# Patient Record
Sex: FEMALE | Race: BLACK OR AFRICAN AMERICAN | NOT HISPANIC OR LATINO | Employment: OTHER | URBAN - METROPOLITAN AREA
[De-identification: names, ages, dates, MRNs, and addresses within clinical notes are randomized per-mention and may not be internally consistent; named-entity substitution may affect disease eponyms.]

---

## 2017-03-20 ENCOUNTER — LAB VISIT (OUTPATIENT)
Dept: LAB | Facility: HOSPITAL | Age: 57
End: 2017-03-20
Attending: INTERNAL MEDICINE
Payer: MEDICARE

## 2017-03-20 DIAGNOSIS — I10 ESSENTIAL HYPERTENSION, MALIGNANT: Primary | ICD-10-CM

## 2017-03-20 LAB
ALBUMIN SERPL BCP-MCNC: 4.7 G/DL
ALP SERPL-CCNC: 58 IU/L
ALT SERPL W/O P-5'-P-CCNC: 28 IU/L
ANION GAP SERPL CALC-SCNC: 13 MMOL/L
AST SERPL-CCNC: 26 IU/L
BASOPHILS # BLD AUTO: 0.04 K/UL
BASOPHILS NFR BLD: 0.4 %
BILIRUB SERPL-MCNC: 0.4 MG/DL
BUN SERPL-MCNC: 9 MG/DL
CALCIUM SERPL-MCNC: 8.6 MG/DL
CHLORIDE SERPL-SCNC: 104 MMOL/L
CO2 SERPL-SCNC: 26 MMOL/L
CREAT SERPL-MCNC: 0.67 MG/DL
DIFFERENTIAL METHOD: NORMAL
EOSINOPHIL # BLD AUTO: 0.3 K/UL
EOSINOPHIL NFR BLD: 3.2 %
ERYTHROCYTE [DISTWIDTH] IN BLOOD BY AUTOMATED COUNT: 12.7 %
EST. GFR  (AFRICAN AMERICAN): >60 ML/MIN/1.73 M^2
EST. GFR  (NON AFRICAN AMERICAN): >60 ML/MIN/1.73 M^2
GLUCOSE SERPL-MCNC: 84 MG/DL
HCT VFR BLD AUTO: 41.5 %
HGB BLD-MCNC: 13.6 G/DL
LYMPHOCYTES # BLD AUTO: 1.9 K/UL
LYMPHOCYTES NFR BLD: 20.9 %
MCH RBC QN AUTO: 30.2 PG
MCHC RBC AUTO-ENTMCNC: 32.8 %
MCV RBC AUTO: 92 FL
MONOCYTES # BLD AUTO: 0.6 K/UL
MONOCYTES NFR BLD: 6.5 %
NEUTROPHILS # BLD AUTO: 6.2 K/UL
NEUTROPHILS NFR BLD: 68.9 %
PLATELET # BLD AUTO: 291 K/UL
PMV BLD AUTO: 11.4 FL
POTASSIUM SERPL-SCNC: 4.1 MMOL/L
PROT SERPL-MCNC: 7.8 G/DL
RBC # BLD AUTO: 4.5 M/UL
SODIUM SERPL-SCNC: 143 MMOL/L
WBC # BLD AUTO: 8.96 K/UL

## 2017-03-20 PROCEDURE — 84443 ASSAY THYROID STIM HORMONE: CPT

## 2017-03-20 PROCEDURE — 85025 COMPLETE CBC W/AUTO DIFF WBC: CPT | Mod: PO

## 2017-03-20 PROCEDURE — 36415 COLL VENOUS BLD VENIPUNCTURE: CPT | Mod: PO

## 2017-03-20 PROCEDURE — 80053 COMPREHEN METABOLIC PANEL: CPT | Mod: PO

## 2017-03-20 PROCEDURE — 80061 LIPID PANEL: CPT

## 2017-03-21 LAB
CHOLEST/HDLC SERPL: 3.5 {RATIO}
HDL/CHOLESTEROL RATIO: 28.7 %
HDLC SERPL-MCNC: 268 MG/DL
HDLC SERPL-MCNC: 77 MG/DL
LDLC SERPL CALC-MCNC: 168.6 MG/DL
NONHDLC SERPL-MCNC: 191 MG/DL
TRIGL SERPL-MCNC: 112 MG/DL
TSH SERPL DL<=0.005 MIU/L-ACNC: 0.56 UIU/ML

## 2017-03-27 ENCOUNTER — HOSPITAL ENCOUNTER (OUTPATIENT)
Dept: RADIOLOGY | Facility: HOSPITAL | Age: 57
Discharge: HOME | End: 2017-03-27
Attending: INTERNAL MEDICINE
Payer: MEDICARE

## 2017-03-27 DIAGNOSIS — Z12.31 SCREENING MAMMOGRAM, ENCOUNTER FOR: ICD-10-CM

## 2017-03-27 PROCEDURE — 77067 SCR MAMMO BI INCL CAD: CPT | Mod: TC

## 2018-03-06 ENCOUNTER — LAB VISIT (OUTPATIENT)
Dept: LAB | Facility: HOSPITAL | Age: 58
End: 2018-03-06
Attending: INTERNAL MEDICINE
Payer: MEDICARE

## 2018-03-06 DIAGNOSIS — E78.2 MIXED HYPERLIPIDEMIA: Primary | ICD-10-CM

## 2018-03-06 LAB
ALBUMIN SERPL BCP-MCNC: 4.4 G/DL
ALP SERPL-CCNC: 63 U/L
ALT SERPL W/O P-5'-P-CCNC: 33 U/L
ANION GAP SERPL CALC-SCNC: 10 MMOL/L
AST SERPL-CCNC: 28 U/L
BASOPHILS # BLD AUTO: 0.03 K/UL
BASOPHILS NFR BLD: 0.4 %
BILIRUB SERPL-MCNC: 0.3 MG/DL
BUN SERPL-MCNC: 13 MG/DL
CALCIUM SERPL-MCNC: 9.4 MG/DL
CHLORIDE SERPL-SCNC: 102 MMOL/L
CO2 SERPL-SCNC: 30 MMOL/L
CREAT SERPL-MCNC: 0.72 MG/DL
DIFFERENTIAL METHOD: NORMAL
EOSINOPHIL # BLD AUTO: 0.4 K/UL
EOSINOPHIL NFR BLD: 5.5 %
ERYTHROCYTE [DISTWIDTH] IN BLOOD BY AUTOMATED COUNT: 13 %
EST. GFR  (AFRICAN AMERICAN): >60 ML/MIN/1.73 M^2
EST. GFR  (NON AFRICAN AMERICAN): >60 ML/MIN/1.73 M^2
GLUCOSE SERPL-MCNC: 89 MG/DL
HCT VFR BLD AUTO: 44.1 %
HGB BLD-MCNC: 14.1 G/DL
LYMPHOCYTES # BLD AUTO: 1.4 K/UL
LYMPHOCYTES NFR BLD: 18.3 %
MCH RBC QN AUTO: 29.9 PG
MCHC RBC AUTO-ENTMCNC: 32 G/DL
MCV RBC AUTO: 94 FL
MONOCYTES # BLD AUTO: 0.6 K/UL
MONOCYTES NFR BLD: 7.2 %
NEUTROPHILS # BLD AUTO: 5.3 K/UL
NEUTROPHILS NFR BLD: 68.3 %
PLATELET # BLD AUTO: 239 K/UL
PMV BLD AUTO: 10.9 FL
POTASSIUM SERPL-SCNC: 4.4 MMOL/L
PROT SERPL-MCNC: 7.6 G/DL
RBC # BLD AUTO: 4.71 M/UL
SODIUM SERPL-SCNC: 142 MMOL/L
TSH SERPL DL<=0.005 MIU/L-ACNC: 0.69 UIU/ML
WBC # BLD AUTO: 7.81 K/UL

## 2018-03-06 PROCEDURE — 80061 LIPID PANEL: CPT

## 2018-03-06 PROCEDURE — 80053 COMPREHEN METABOLIC PANEL: CPT | Mod: PO

## 2018-03-06 PROCEDURE — 36415 COLL VENOUS BLD VENIPUNCTURE: CPT | Mod: PO

## 2018-03-06 PROCEDURE — 85025 COMPLETE CBC W/AUTO DIFF WBC: CPT | Mod: PO

## 2018-03-06 PROCEDURE — 84443 ASSAY THYROID STIM HORMONE: CPT | Mod: PO

## 2018-03-07 LAB
CHOLEST SERPL-MCNC: 233 MG/DL
CHOLEST/HDLC SERPL: 3.2 {RATIO}
HDLC SERPL-MCNC: 72 MG/DL
HDLC SERPL: 30.9 %
LDLC SERPL CALC-MCNC: 145.6 MG/DL
NONHDLC SERPL-MCNC: 161 MG/DL
TRIGL SERPL-MCNC: 77 MG/DL

## 2018-03-29 ENCOUNTER — HOSPITAL ENCOUNTER (OUTPATIENT)
Dept: RADIOLOGY | Facility: HOSPITAL | Age: 58
Discharge: HOME | End: 2018-03-29
Attending: INTERNAL MEDICINE
Payer: MEDICARE

## 2018-03-29 DIAGNOSIS — Z12.31 SCREENING MAMMOGRAM, ENCOUNTER FOR: ICD-10-CM

## 2018-03-29 DIAGNOSIS — Z12.31 SCREENING MAMMOGRAM, ENCOUNTER FOR: Primary | ICD-10-CM

## 2018-03-29 PROCEDURE — 77067 SCR MAMMO BI INCL CAD: CPT | Mod: TC,PO

## 2018-04-19 ENCOUNTER — OFFICE VISIT (OUTPATIENT)
Dept: GASTROENTEROLOGY | Facility: CLINIC | Age: 58
End: 2018-04-19
Payer: MEDICARE

## 2018-04-19 VITALS
HEART RATE: 78 BPM | DIASTOLIC BLOOD PRESSURE: 85 MMHG | HEIGHT: 63 IN | BODY MASS INDEX: 36.39 KG/M2 | WEIGHT: 205.38 LBS | SYSTOLIC BLOOD PRESSURE: 132 MMHG

## 2018-04-19 DIAGNOSIS — Z12.11 COLON CANCER SCREENING: Primary | ICD-10-CM

## 2018-04-19 PROCEDURE — 99203 OFFICE O/P NEW LOW 30 MIN: CPT | Mod: S$PBB,,, | Performed by: INTERNAL MEDICINE

## 2018-04-19 PROCEDURE — 99213 OFFICE O/P EST LOW 20 MIN: CPT | Mod: PBBFAC,PN | Performed by: INTERNAL MEDICINE

## 2018-04-19 PROCEDURE — 99999 PR PBB SHADOW E&M-EST. PATIENT-LVL III: CPT | Mod: PBBFAC,,, | Performed by: INTERNAL MEDICINE

## 2018-04-19 RX ORDER — ATORVASTATIN CALCIUM 40 MG/1
40 TABLET, FILM COATED ORAL NIGHTLY
COMMUNITY

## 2018-04-19 NOTE — PATIENT INSTRUCTIONS
Colonoscopy     A camera attached to a flexible tube with a viewing lens is used to take video pictures.     Colonoscopy is a test to view the inside of your lower digestive tract (colon and rectum). Sometimes it can show the last part of the small intestine (ileum). During the test, small pieces of tissue may be removed for testing. This is called a biopsy. Small growths, such as polyps, may also be removed.   Why is colonoscopy done?  The test is done to help look for colon cancer. And it can help find the source of abdominal pain, bleeding, and changes in bowel habits. It may be needed once a year, depending on factors such as your:  · Age  · Health history  · Family health history  · Symptoms  · Results from any prior colonoscopy  Risks and possible complications  These include:  · Bleeding               · A puncture or tear in the colon   · Risks of anesthesia  · A cancer lesion not being seen  Getting ready   To prepare for the test:  · Talk with your healthcare provider about the risks of the test (see below). Also ask your healthcare provider about alternatives to the test.  · Tell your healthcare provider about any medicines you take. Also tell him or her about any health conditions you may have.  · Make sure your rectum and colon are empty for the test. Follow the diet and bowel prep instructions exactly. If you dont, the test may need to be rescheduled.  · Plan for a friend or family member to drive you home after the test.     Colonoscopy provides an inside view of the entire colon.     You may discuss the results with your doctor right away or at a future visit.  During the test   The test is usually done in the hospital on an outpatient basis. This means you go home the same day. The procedure takes about 30 minutes. During that time:  · You are given relaxing (sedating) medicine through an IV line. You may be drowsy, or fully asleep.  · The healthcare provider will first give you a physical exam to  check for anal and rectal problems.  · Then the anus is lubricated and the scope inserted.  · If you are awake, you may have a feeling similar to needing to have a bowel movement. You may also feel pressure as air is pumped into the colon. Its OK to pass gas during the procedure.  · Biopsy, polyp removal, or other treatments may be done during the test.  After the test   You may have gas right after the test. It can help to try to pass it to help prevent later bloating. Your healthcare provider may discuss the results with you right away. Or you may need to schedule a follow-up visit to talk about the results. After the test, you can go back to your normal eating and other activities. You may be tired from the sedation and need to rest for a few hours.  Date Last Reviewed: 11/1/2016 © 2000-2017 [employer]. [address] [place] [address], [place] [address]. All rights reserved. This information is not intended as a substitute for professional medical care. Always follow your healthcare professional's instructions.

## 2018-04-19 NOTE — LETTER
April 19, 2018      Dacia Ayon MD  [address]  [address]  [facility]  [place] [address]           [place] [facility]  [address], [facility] [address],  [place] [address]  Phone: [phone]  Fax: [phone]          Patient: La Ly   MR Number: [id number]   YOB: 1960   Date of Visit: 4/19/2018       Dear Dr. Dacia Ayon:    Thank you for referring La Ly to me for evaluation. Attached you will find relevant portions of my assessment and plan of care.    If you have questions, please do not hesitate to call me. I look forward to following La Ly along with you.    Sincerely,    Hayden Doherty Jr., MD    Enclosure  CC:  No Recipients    If you would like to receive this communication electronically, please contact [email or web address] or [phone] to request more information on [facility] access.    For providers and/or their staff who would like to refer a patient to [facility], please contact us through our one-stop-shop provider referral line, [facility], at [phone].    If you feel you have received this communication in error or would no longer like to receive these types of communications, please e-mail [email or web address]

## 2018-04-19 NOTE — PROGRESS NOTES
History and Physical      Chief complaints: Requesting screening colonscopy    History of Presenting Illness    Patient requesting colonoscopy.  Patient denies any abdominal pain, weight loss or blood in the stool.  There is no family history of colon cancer.  GI systems review is negative  Past medical history is unremarkable.  Occasional smoker of cigarettes.  Family history negative for GI neoplasm.       No past medical history on file.    Past Surgical History:   Procedure Laterality Date    HYSTERECTOMY         Family History   Problem Relation Age of Onset    No Known Problems Mother     No Known Problems Father        Social History     Social History    Marital status:      Spouse name: N/A    Number of children: N/A    Years of education: N/A     Social History Main Topics    Smoking status: Never Smoker    Smokeless tobacco: None    Alcohol use No    Drug use: No    Sexual activity: Not Asked     Other Topics Concern    None     Social History Narrative    None       Current Outpatient Prescriptions   Medication Sig Dispense Refill    atorvastatin (LIPITOR) 40 MG tablet Take 40 mg by mouth every evening.      hydrocodone-acetaminophen 5-325mg (NORCO) 5-325 mg per tablet Take 1 tablet by mouth every 4 (four) hours as needed for Pain. 20 tablet 0    naproxen (NAPROSYN) 500 MG tablet Take 1 tablet (500 mg total) by mouth 2 (two) times daily with meals. 14 tablet 0     No current facility-administered medications for this visit.        Review of patient's allergies indicates:  No Known Allergies    Objective:      Vitals:    04/19/18 1530   BP: 132/85   Pulse: 78     Physical Exam   Constitutional: Patient is oriented to person, place, and time. Appears well-nourished.   HENT: sclera non icteric  Mouth/Throat: Oropharynx is clear and moist.   Eyes: Pupils are equal, round, and reactive to light.   Neck: Neck supple.   Cardiovascular: Normal heart sounds.   Pulmonary/Chest: Effort  normal and breath sounds normal.   Abdominal: Soft. Exhibits no mass. There is no tenderness. There is no guarding.    Neurological:Alert and oriented to person, place, and time.   Skin: Skin is warm. No rash noted.   Psychiatric: Has a normal mood and affect.     Assessment:  Colon cancer screening    Plan:  Colonoscopy     CC Dr. beatriz PLATA have reviewed the patient's medical history in detail and updated the computerized patient record

## 2018-04-20 ENCOUNTER — TELEPHONE (OUTPATIENT)
Dept: GASTROENTEROLOGY | Facility: CLINIC | Age: 58
End: 2018-04-20

## 2018-04-23 ENCOUNTER — TELEPHONE (OUTPATIENT)
Dept: GASTROENTEROLOGY | Facility: CLINIC | Age: 58
End: 2018-04-23

## 2018-04-23 DIAGNOSIS — Z12.11 ENCOUNTER FOR SCREENING COLONOSCOPY: Primary | ICD-10-CM

## 2018-04-23 NOTE — TELEPHONE ENCOUNTER
----- Message from Kat Bennett sent at 4/23/2018  1:59 PM CDT -----  Contact: self/[phone]  Patient would like to be scheduled for a colonoscopy.  Please advise

## 2018-04-23 NOTE — TELEPHONE ENCOUNTER
Patient is scheduled for Screening Colonoscopy at [facility] on 5/14/18,prep instructions was explained and given at office visit.

## 2018-04-23 NOTE — TELEPHONE ENCOUNTER
A message was left on patient's voice mail that the office was returning her call in regards to scheduling a Colnoscopy.

## 2018-04-23 NOTE — TELEPHONE ENCOUNTER
----- Message from Karla Ramsey sent at 4/23/2018  2:30 PM CDT -----  Contact: Self [phone]  Patient Returning Your Phone Call

## 2018-04-23 NOTE — TELEPHONE ENCOUNTER
----- Message from Lala Poon sent at 4/23/2018 11:46 AM CDT -----  Contact: [phone]/self  Patient is returning your call. Please advise.

## 2018-05-14 ENCOUNTER — ANESTHESIA (OUTPATIENT)
Dept: ENDOSCOPY | Facility: HOSPITAL | Age: 58
End: 2018-05-14
Payer: MEDICARE

## 2018-05-14 ENCOUNTER — ANESTHESIA EVENT (OUTPATIENT)
Dept: ENDOSCOPY | Facility: HOSPITAL | Age: 58
End: 2018-05-14
Payer: MEDICARE

## 2018-05-14 ENCOUNTER — HOSPITAL ENCOUNTER (OUTPATIENT)
Facility: HOSPITAL | Age: 58
Discharge: HOME | End: 2018-05-14
Attending: INTERNAL MEDICINE | Admitting: INTERNAL MEDICINE
Payer: MEDICARE

## 2018-05-14 VITALS
BODY MASS INDEX: 31.39 KG/M2 | SYSTOLIC BLOOD PRESSURE: 153 MMHG | OXYGEN SATURATION: 100 % | DIASTOLIC BLOOD PRESSURE: 78 MMHG | TEMPERATURE: 98 F | RESPIRATION RATE: 14 BRPM | HEIGHT: 67 IN | HEART RATE: 63 BPM | WEIGHT: 200 LBS

## 2018-05-14 DIAGNOSIS — Z12.11 SCREENING FOR COLORECTAL CANCER: ICD-10-CM

## 2018-05-14 DIAGNOSIS — Z12.12 SCREENING FOR COLORECTAL CANCER: ICD-10-CM

## 2018-05-14 DIAGNOSIS — Z12.11 COLON CANCER SCREENING: Primary | ICD-10-CM

## 2018-05-14 PROCEDURE — 45378 DIAGNOSTIC COLONOSCOPY: CPT | Mod: ,,, | Performed by: INTERNAL MEDICINE

## 2018-05-14 PROCEDURE — 00812 ANES LWR INTST SCR COLSC: CPT | Performed by: INTERNAL MEDICINE

## 2018-05-14 PROCEDURE — 25000003 PHARM REV CODE 250: Performed by: INTERNAL MEDICINE

## 2018-05-14 PROCEDURE — 63600175 PHARM REV CODE 636 W HCPCS: Performed by: NURSE ANESTHETIST, CERTIFIED REGISTERED

## 2018-05-14 PROCEDURE — G0121 COLON CA SCRN NOT HI RSK IND: HCPCS | Performed by: INTERNAL MEDICINE

## 2018-05-14 PROCEDURE — 37000009 HC ANESTHESIA EA ADD 15 MINS: Performed by: INTERNAL MEDICINE

## 2018-05-14 PROCEDURE — 37000008 HC ANESTHESIA 1ST 15 MINUTES: Performed by: INTERNAL MEDICINE

## 2018-05-14 RX ORDER — PROPOFOL 10 MG/ML
VIAL (ML) INTRAVENOUS CONTINUOUS PRN
Status: DISCONTINUED | OUTPATIENT
Start: 2018-05-14 | End: 2018-05-14

## 2018-05-14 RX ORDER — PROPOFOL 10 MG/ML
VIAL (ML) INTRAVENOUS
Status: DISCONTINUED | OUTPATIENT
Start: 2018-05-14 | End: 2018-05-14

## 2018-05-14 RX ORDER — SODIUM CHLORIDE 9 MG/ML
INJECTION, SOLUTION INTRAVENOUS CONTINUOUS
Status: DISCONTINUED | OUTPATIENT
Start: 2018-05-14 | End: 2018-05-14 | Stop reason: HOSPADM

## 2018-05-14 RX ORDER — LIDOCAINE HCL/PF 100 MG/5ML
SYRINGE (ML) INTRAVENOUS
Status: DISCONTINUED | OUTPATIENT
Start: 2018-05-14 | End: 2018-05-14

## 2018-05-14 RX ADMIN — SODIUM CHLORIDE: 0.9 INJECTION, SOLUTION INTRAVENOUS at 01:05

## 2018-05-14 RX ADMIN — LIDOCAINE HYDROCHLORIDE 100 MG: 20 INJECTION, SOLUTION INTRAVENOUS at 01:05

## 2018-05-14 RX ADMIN — PROPOFOL 80 MG: 10 INJECTION, EMULSION INTRAVENOUS at 01:05

## 2018-05-14 RX ADMIN — PROPOFOL 150 MCG/KG/MIN: 10 INJECTION, EMULSION INTRAVENOUS at 01:05

## 2018-05-14 NOTE — TRANSFER OF CARE
"Anesthesia Transfer of Care Note    Patient: La Ly    Procedure(s) Performed: Procedure(s) (LRB):  COLONOSCOPY (N/A)    Patient location: GI    Anesthesia Type: MAC    Transport from OR: Transported from OR on room air with adequate spontaneous ventilation    Post pain: adequate analgesia    Post assessment: no apparent anesthetic complications and tolerated procedure well    Post vital signs: stable    Level of consciousness: awake, alert and oriented    Nausea/Vomiting: no nausea/vomiting    Complications: none    Transfer of care protocol was followed      Last vitals:   Visit Vitals  BP (!) 150/65   Pulse 67   Temp 36.7 °C (98 °F)   Resp 20   Ht 5' 7" (1.702 m)   Wt 90.7 kg (200 lb)   SpO2 97%   Breastfeeding? No   BMI 31.32 kg/m²     "

## 2018-05-14 NOTE — ANESTHESIA POSTPROCEDURE EVALUATION
"Anesthesia Post Evaluation    Patient: La Ly    Procedure(s) Performed: Procedure(s) (LRB):  COLONOSCOPY (N/A)    Final Anesthesia Type: MAC  Patient location during evaluation: GI PACU  Patient participation: Yes- Able to Participate  Level of consciousness: awake and alert and oriented  Post-procedure vital signs: reviewed and stable  Pain management: adequate  Airway patency: patent  PONV status at discharge: No PONV  Anesthetic complications: no      Cardiovascular status: blood pressure returned to baseline, hemodynamically stable and stable  Respiratory status: unassisted, spontaneous ventilation and room air  Hydration status: euvolemic  Follow-up not needed.        Visit Vitals  BP (!) 150/65   Pulse 67   Temp 36.7 °C (98 °F)   Resp 20   Ht 5' 7" (1.702 m)   Wt 90.7 kg (200 lb)   SpO2 97%   Breastfeeding? No   BMI 31.32 kg/m²       Pain/Luiz Score: Pain Assessment Performed: Yes (5/14/2018  1:34 PM)  Presence of Pain: denies (5/14/2018  1:24 PM)      "

## 2018-05-14 NOTE — ANESTHESIA PREPROCEDURE EVALUATION
05/14/2018  La Ly is a 58 y.o., female for colonoscopy under MAC          Anesthesia Evaluation    I have reviewed the Patient Summary Reports.     I have reviewed the Medications.     Review of Systems  Cardiovascular:   hyperlipidemia    Hepatic/GI:   Bowel Prep.        Physical Exam  General:  Well nourished       Chest/Lungs:  Chest/Lungs Clear    Heart/Vascular:  Heart Findings: Normal            Anesthesia Plan  Type of Anesthesia, risks & benefits discussed:  Anesthesia Type:  MAC  Patient's Preference:   Intra-op Monitoring Plan:   Intra-op Monitoring Plan Comments:   Post Op Pain Control Plan:   Post Op Pain Control Plan Comments:   Induction:    Beta Blocker:  Patient is not currently on a Beta-Blocker (No further documentation required).       Informed Consent: Patient understands risks and agrees with Anesthesia plan.  Questions answered. Anesthesia consent signed with patient.  ASA Score: 2     Day of Surgery Review of History & Physical:            Ready For Surgery From Anesthesia Perspective.

## 2018-05-14 NOTE — PROVATION PATIENT INSTRUCTIONS
Discharge Summary/Instructions after an Endoscopic Procedure  Patient Name: La Ly  Patient MRN: [id number]  Patient YOB: 1960  Monday, May 14, 2018  Hayden Doherty MD  RESTRICTIONS:  During your procedure today, you received medications for sedation.  These   medications may affect your judgment, balance and coordination.  Therefore,   for 24 hours, you have the following restrictions:   - DO NOT drive a car, operate machinery, make legal/financial decisions,   sign important papers or drink alcohol.    ACTIVITY:  The following day: return to full activity including work, except no heavy   lifting, straining or running for 3 days if polyps were removed.  DIET:  Eat and drink normally unless instructed otherwise.     TREATMENT FOR COMMON SIDE EFFECTS:  - Mild abdominal pain, nausea, belching, bloating or excessive gas:  rest,   eat lightly and use a heating pad.  - Sore Throat: treat with throat lozenges and/or gargle with warm salt   water.  - Because air was used during the procedure, expelling large amounts of air   from your rectum or belching is normal.  - If a bowel prep was taken, you may not have a bowel movement for 1-3 days.    This is normal.  SYMPTOMS TO WATCH FOR AND REPORT TO YOUR PHYSICIAN:  1. Abdominal pain or bloating, other than gas cramps.  2. Chest pain.  3. Back pain.  4. Signs of infection such as: chills or fever occurring within 24 hours   after the procedure.  5. Rectal bleeding, which would show as bright red, maroon, or black stools.   (A tablespoon of blood from the rectum is not serious, especially if   hemorrhoids are present.)  6. Vomiting.  7. Weakness or dizziness.  GO DIRECTLY TO THE NEAREST EMERGENCY ROOM IF YOU HAVE ANY OF THE FOLLOWING:      Difficulty breathing              Chills and/or fever over 101 F   Persistent vomiting and/or vomiting blood   Severe abdominal pain   Severe chest pain   Black, tarry stools   Bleeding- more than one tablespoon   Any  other symptom or condition that you feel may need urgent attention  Your doctor recommends these additional instructions:  If any biopsies were taken, your doctors clinic will contact you in 1 to 2   weeks with any results.  - Discharge patient to home.   - Patient has a contact number available for emergencies.  The signs and   symptoms of potential delayed complications were discussed with the   patient.  Return to normal activities tomorrow.  Written discharge   instructions were provided to the patient.   - Resume previous diet.   - Continue present medications.   - Repeat colonoscopy in 10 years for screening purposes.   - Return to primary care physician.  For questions, problems or results please call your physician - Hayden Doherty MD at Work:  [phone].  EMERGENCY PHONE NUMBER: [phone],  LAB RESULTS: [phone]  IF A COMPLICATION OR EMERGENCY SITUATION ARISES AND YOU ARE UNABLE TO REACH   YOUR PHYSICIAN - GO DIRECTLY TO THE EMERGENCY ROOM.  Hayden Doherty MD  5/14/2018 2:10:56 PM  This report has been verified and signed electronically.

## 2018-05-23 NOTE — H&P
Patient requesting colonoscopy.  Patient denies any abdominal pain, weight loss or blood in the stool.  There is no family history of colon cancer.  GI systems review is negative  Past medical history is unremarkable.  Occasional smoker of cigarettes.  Family history negative for GI neoplasm.        No past medical history on file.           Past Surgical History:   Procedure Laterality Date    HYSTERECTOMY                   Family History   Problem Relation Age of Onset    No Known Problems Mother      No Known Problems Father           Social History            Social History    Marital status:        Spouse name: N/A    Number of children: N/A    Years of education: N/A           Social History Main Topics    Smoking status: Never Smoker    Smokeless tobacco: None    Alcohol use No    Drug use: No    Sexual activity: Not Asked           Other Topics Concern    None          Social History Narrative    None         Current Medications          Current Outpatient Prescriptions   Medication Sig Dispense Refill    atorvastatin (LIPITOR) 40 MG tablet Take 40 mg by mouth every evening.        hydrocodone-acetaminophen 5-325mg (NORCO) 5-325 mg per tablet Take 1 tablet by mouth every 4 (four) hours as needed for Pain. 20 tablet 0    naproxen (NAPROSYN) 500 MG tablet Take 1 tablet (500 mg total) by mouth 2 (two) times daily with meals. 14 tablet 0      No current facility-administered medications for this visit.             Review of patient's allergies indicates:  No Known Allergies     Objective:           Vitals:                  Physical Exam   Constitutional: Patient is oriented to person, place, and time. Appears well-nourished.   HENT: sclera non icteric  Mouth/Throat: Oropharynx is clear and moist.   Eyes: Pupils are equal, round, and reactive to light.   Neck: Neck supple.   Cardiovascular: Normal heart sounds.   Pulmonary/Chest: Effort normal and breath sounds normal.   Abdominal: Soft.  Exhibits no mass. There is no tenderness. There is no guarding.    Neurological:Alert and oriented to person, place, and time.   Skin: Skin is warm. No rash noted.   Psychiatric: Has a normal mood and affect.      Assessment:  Colon cancer screening     Plan:  Colonoscopy      CC Dr. beatriz PLATA have reviewed the patient's medical history in detail and updated the computerized patient record

## 2020-01-08 ENCOUNTER — HOSPITAL ENCOUNTER (OUTPATIENT)
Dept: RADIOLOGY | Facility: HOSPITAL | Age: 60
Discharge: HOME | End: 2020-01-08
Attending: INTERNAL MEDICINE
Payer: MEDICARE

## 2020-01-08 DIAGNOSIS — Z12.31 ENCOUNTER FOR SCREENING MAMMOGRAM FOR MALIGNANT NEOPLASM OF BREAST: ICD-10-CM

## 2020-01-08 DIAGNOSIS — Z13.820 ENCOUNTER FOR IMAGING TO ASSESS OSTEOPOROSIS: ICD-10-CM

## 2020-01-08 PROCEDURE — 77080 DXA BONE DENSITY AXIAL: CPT | Mod: GA,TC,PO

## 2020-01-08 PROCEDURE — 77067 SCR MAMMO BI INCL CAD: CPT | Mod: TC,PO

## 2020-11-30 NOTE — OR NURSING
Family updated as to patient's status.  
Introduction Text (Please End With A Colon): The following procedure was deferred
Detail Level: Detailed
Procedure To Be Performed At Next Visit: Prescription

## 2021-05-28 ENCOUNTER — IMMUNIZATION (OUTPATIENT)
Dept: PRIMARY CARE CLINIC | Facility: CLINIC | Age: 61
End: 2021-05-28

## 2021-05-28 DIAGNOSIS — Z23 NEED FOR VACCINATION: Primary | ICD-10-CM

## 2021-05-28 PROCEDURE — 0001A COVID-19, MRNA, LNP-S, PF, 30 MCG/0.3 ML DOSE VACCINE: ICD-10-PCS | Mod: CV19,S$GLB,, | Performed by: INTERNAL MEDICINE

## 2021-05-28 PROCEDURE — 91300 COVID-19, MRNA, LNP-S, PF, 30 MCG/0.3 ML DOSE VACCINE: ICD-10-PCS | Mod: S$GLB,,, | Performed by: INTERNAL MEDICINE

## 2021-05-28 PROCEDURE — 91300 COVID-19, MRNA, LNP-S, PF, 30 MCG/0.3 ML DOSE VACCINE: CPT | Mod: S$GLB,,, | Performed by: INTERNAL MEDICINE

## 2021-05-28 PROCEDURE — 0001A COVID-19, MRNA, LNP-S, PF, 30 MCG/0.3 ML DOSE VACCINE: CPT | Mod: CV19,S$GLB,, | Performed by: INTERNAL MEDICINE

## 2021-06-22 ENCOUNTER — IMMUNIZATION (OUTPATIENT)
Dept: FAMILY MEDICINE | Facility: CLINIC | Age: 61
End: 2021-06-22
Payer: MEDICAID

## 2021-06-22 DIAGNOSIS — Z23 NEED FOR VACCINATION: Primary | ICD-10-CM

## 2021-06-22 PROCEDURE — 91300 COVID-19, MRNA, LNP-S, PF, 30 MCG/0.3 ML DOSE VACCINE: CPT | Mod: PBBFAC,PN

## 2021-06-22 PROCEDURE — 0002A COVID-19, MRNA, LNP-S, PF, 30 MCG/0.3 ML DOSE VACCINE: CPT | Mod: PBBFAC,PN

## 2022-01-25 ENCOUNTER — LAB VISIT (OUTPATIENT)
Dept: PRIMARY CARE CLINIC | Facility: OTHER | Age: 62
End: 2022-01-25
Attending: INTERNAL MEDICINE
Payer: MEDICARE

## 2022-01-25 DIAGNOSIS — U07.1 COVID-19: Primary | ICD-10-CM

## 2022-01-25 LAB
CTP QC/QA: YES
SARS-COV-2 AG RESP QL IA.RAPID: NEGATIVE

## 2022-01-25 PROCEDURE — 87811 SARS-COV-2 COVID19 W/OPTIC: CPT

## 2022-01-25 NOTE — PROGRESS NOTES
Instructions for Patients with Confirmed or Suspected COVID-19    If you are awaiting your test result, you will either be called or it will be released to the patient portal.  If you have any questions about your test, please visit [email or web address] or call our COVID-19 information line at [phone].      Please isolate yourself at home.  You may leave home and/or return to work once the following conditions are met:    If you have symptoms and tested positive:   More than 5 days since symptoms first appeared AND   More than 24 hours fever free without medications AND       symptoms have improved   · For five days after ending isolation, masks are required.    If you had no symptoms but tested positive:   More than 5 days since the date of the first positive test. If you develop symptoms, then use the guidelines above  · For five days after ending isolation, masks are required.      Testing is not recommended if you are symptom free after completing isolation.

## 2022-03-17 ENCOUNTER — HOSPITAL ENCOUNTER (OUTPATIENT)
Dept: RADIOLOGY | Facility: HOSPITAL | Age: 62
Discharge: HOME | End: 2022-03-17
Attending: INTERNAL MEDICINE
Payer: MEDICARE

## 2022-03-17 DIAGNOSIS — R10.84 ABDOMINAL PAIN, GENERALIZED: ICD-10-CM

## 2022-03-17 PROCEDURE — 76830 TRANSVAGINAL US NON-OB: CPT | Mod: TC,PO

## 2022-03-17 PROCEDURE — 76700 US EXAM ABDOM COMPLETE: CPT | Mod: TC,PO

## 2022-06-15 DIAGNOSIS — M25.462 EFFUSION OF LEFT KNEE: Primary | ICD-10-CM

## 2022-06-15 DIAGNOSIS — M25.562 PAIN IN LEFT KNEE: ICD-10-CM

## 2022-07-13 ENCOUNTER — HOSPITAL ENCOUNTER (EMERGENCY)
Facility: HOSPITAL | Age: 62
Discharge: HOME | End: 2022-07-14
Payer: MEDICARE

## 2022-07-13 DIAGNOSIS — M25.562 LEFT KNEE PAIN: ICD-10-CM

## 2022-07-13 PROCEDURE — 99283 EMERGENCY DEPT VISIT LOW MDM: CPT

## 2022-07-13 NOTE — Clinical Note
"La CURIEL "Mahin Ly was seen and treated in our emergency department on 7/13/2022.  She may return to work on 07/15/2022.  Please excuse from work on 7/14/22.     If you have any questions or concerns, please don't hesitate to call.      Matias Giron MD"

## 2022-07-14 VITALS
BODY MASS INDEX: 35.24 KG/M2 | TEMPERATURE: 98 F | HEART RATE: 77 BPM | DIASTOLIC BLOOD PRESSURE: 71 MMHG | RESPIRATION RATE: 16 BRPM | OXYGEN SATURATION: 100 % | WEIGHT: 225 LBS | SYSTOLIC BLOOD PRESSURE: 135 MMHG

## 2022-07-14 RX ORDER — HYDROCODONE BITARTRATE AND ACETAMINOPHEN 5; 325 MG/1; MG/1
1 TABLET ORAL EVERY 6 HOURS PRN
Qty: 12 TABLET | Refills: 0 | Status: SHIPPED | OUTPATIENT
Start: 2022-07-14

## 2022-07-14 NOTE — ED NOTES
Pt c/o L knee pain and wants an XRAY. Pt denies recent trauma and other complaints.     Review of patient's allergies indicates:  No Known Allergies     Patient has verified the spelling of their name and  on armband.   APPEARANCE: Patient is alert, calm, oriented x 4, and does not appear distressed.  SKIN: Skin is normal for race, warm, and dry. Normal skin turgor and mucous membranes moist.  CARDIAC: Normal rate and rhythm, no murmur heard.   RESPIRATORY:Normal rate and effort. Breath sounds clear bilaterally throughout chest. Respirations are equal and unlabored.    GASTRO: Bowel sounds normal, abdomen is soft, no tenderness, and no abdominal distention.  MUSCLE: Full ROM. No bony tenderness or soft tissue tenderness. No obvious deformity.  PERIPHERAL VASCULAR: peripheral pulses present. Normal cap refill. No edema. Warm to touch.  NEURO: 5/5 strength major flexors/extensors bilaterally. Sensory intact to light touch bilaterally. [place] coma scale: eyes open spontaneously-4, oriented & converses-5, obeys commands-6. No neurological abnormalities.   MENTAL STATUS: awake, alert and aware of environment.  : Voids without complication      ED orders in progress. Pt SR up x 2. Bed in lowest position with wheels locked. Call bell within reach of pt.

## 2022-07-14 NOTE — FIRST PROVIDER EVALUATION
Emergency Department TeleTriage Encounter Note      CHIEF COMPLAINT    Chief Complaint   Patient presents with    Knee Pain     Left lateral knee pain that started 3 months ago after missing a step and falling on knee. Was seen by PCP 1 month ago and prescribed Mobic. States has not been helping.  Requesting an xray since it was not done. Patient is ambulatory.        VITAL SIGNS   Initial Vitals [07/13/22 2126]   BP Pulse Resp Temp SpO2   (!) 144/71 79 18 98.2 °F (36.8 °C) 98 %      MAP       --            ALLERGIES    Review of patient's allergies indicates:  No Known Allergies    PROVIDER TRIAGE NOTE  This is a teletriage evaluation of a 62 y.o. female presenting to the ED with c/o left lateral knee pain.  Pt states 3 months ago she missed a step and fell.  Pt was seen by PCP and set up PT but patient has not started yet.  Pt had imaging done 6/16 (but does not remember) and denies any falls since that time.      PE: VSS.  NAD noted.  Steady gait appreciated.      Plan: monitor    Limited physical exam via telehealth: The patient is awake, alert, answering questions appropriately and is not in respiratory distress. All ED beds are full at present; patient notified of this status.  Patient seen and medically screened by Nurse Practitioner via teletriage.      Initial orders will be placed and care will be transferred to an alternate provider when patient is roomed for a full evaluation. Any additional orders and the final disposition will be determined by that provider.         ORDERS  Labs Reviewed - No data to display    ED Orders (720h ago, onward)    None            Virtual Visit Note: The provider triage portion of this emergency department evaluation and documentation was performed via [employer], a HIPAA-compliant telemedicine application, in concert with a tele-presenter in the room. A face to face patient evaluation with one of my colleagues will occur once the patient is placed in an emergency  department room.      DISCLAIMER: This note was prepared with [employer] voice recognition transcription software. Garbled syntax, mangled pronouns, and other bizarre constructions may be attributed to that software system.

## 2022-07-14 NOTE — ED PROVIDER NOTES
Encounter Date: 7/13/2022       History     Chief Complaint   Patient presents with    Knee Pain     Left lateral knee pain that started 3 months ago after missing a step and falling on knee. Was seen by PCP 1 month ago and prescribed Mobic. States has not been helping.  Requesting an xray since it was not done. Patient is ambulatory.      HPI   Patient presenting to ED for evaluation of left knee pain ongoing over the past 3 months.  Patient says patient initially started after having accidental fall, landing on anterior left knee.  Since then she has continued to have pain along the anterior and medial aspects of her left knee.  Pain seems worse with ambulation but patient reports being able to walk.  Has tried anti-inflammatory medication and ace wrap to area without much relief.  She had seen her PCP about a month ago for this complaints and was prescribed Mobic which has not helped, says she has not had any imaging of knee so far.  Denies any new injury or trauma.  No other specific complaints at this time.      Review of patient's allergies indicates:  No Known Allergies  Past Medical History:   Diagnosis Date    Eczema     Hyperlipidemia      Past Surgical History:   Procedure Laterality Date    COLONOSCOPY N/A 5/14/2018    Procedure: COLONOSCOPY;  Surgeon: Hayden Doherty Jr., MD;  Location: [facility];  Service: Endoscopy;  Laterality: N/A;    TUBAL LIGATION       Family History   Problem Relation Age of Onset    COPD Mother     No Known Problems Father      Social History     Tobacco Use    Smoking status: Current Every Day Smoker     Packs/day: 0.50    Smokeless tobacco: Never Used    Tobacco comment: 2 cig a day   Substance Use Topics    Alcohol use: No    Drug use: No     Review of Systems   Constitutional: Negative for chills and fever.   Respiratory: Negative for shortness of breath.    Cardiovascular: Negative for chest pain.   Gastrointestinal: Negative for abdominal pain.    Musculoskeletal: Positive for arthralgias.   Skin: Negative for color change, pallor, rash and wound.   Allergic/Immunologic: Negative for immunocompromised state.   Neurological: Negative for numbness.   Hematological: Does not bruise/bleed easily.       Physical Exam     Initial Vitals [07/13/22 2126]   BP Pulse Resp Temp SpO2   (!) 144/71 79 18 98.2 °F (36.8 °C) 98 %      MAP       --         Physical Exam    Constitutional: She appears well-developed. No distress.   HENT:   Head: Normocephalic.   Neck:   Normal range of motion.  Cardiovascular: Normal rate, regular rhythm and intact distal pulses.   Pulmonary/Chest: No respiratory distress.   Musculoskeletal:      Cervical back: Normal range of motion.      Left knee: Swelling (mild) present. No deformity or crepitus. Tenderness present over the medial joint line. No LCL laxity, MCL laxity, ACL laxity or PCL laxity.Normal alignment and normal patellar mobility. Normal pulse.      Instability Tests: Anterior drawer test negative. Posterior drawer test negative.        Legs:       Comments: No tenderness to left hip, ankle, or foot.  Distal neurovascular function intact.     Neurological: She is alert and oriented to person, place, and time. She has normal strength. No cranial nerve deficit or sensory deficit.   Skin: Skin is warm and dry. Capillary refill takes less than 2 seconds. No rash noted.   Psychiatric: She has a normal mood and affect.         ED Course   Procedures  Labs Reviewed - No data to display       Imaging Results          X-Ray Knee 3 View Left (Final result)  Result time 07/14/22 00:09:45    Final result by Feliciano Amador MD (07/14/22 00:09:45)                 Impression:      Negative left knee, stable since exam      Electronically signed by: Feliciano Amador  Date:    07/14/2022  Time:    00:09             Narrative:    EXAMINATION:  XR KNEE 3 VIEW LEFT    CLINICAL HISTORY:  Pain in left knee    TECHNIQUE:  AP, lateral, and Merchant  views of the left knee were performed.    COMPARISON:  06/16/2022 left knee    FINDINGS:  The bones, joint spaces and soft tissues appear intact.  There is no evidence of fracture or effusion.                                 Medications - No data to display    MDM:  Xrays of left knee negative for acute abnormalities.  Patient initially reported no prior imaging had been done, however in review of old records it appears xray with no acute findings was performed on 6/16 ordered by her PCP.  Given persistence of left knee pain over past several months, patient may benefit from orthopedic surgery evaluation, outpatient referral to be initiated in ED.  She was also encouraged to follow up with her PCP regarding possible physical therapy.  Does not appear to be indication for further emergent testing, observation, or hospitalization at this time.  Patient appears stable for and is comfortable with discharge home.  Advised to follow-up with PCP for outpatient recheck.  Signs and symptoms that would warrant immediate return to ED were reviewed prior to discharge.      Clinical Impression:   Final diagnoses:  [M25.562] Left knee pain        ED Disposition Condition    Discharge Stable        ED Prescriptions     Medication Sig Dispense Start Date End Date Auth. Provider    HYDROcodone-acetaminophen (NORCO) 5-325 mg per tablet Take 1 tablet by mouth every 6 (six) hours as needed for Pain. 12 tablet 7/14/2022  Matias Giron MD        Follow-up Information     Follow up With Specialties Details Why Contact Info    Dacia Ayon MD Internal Medicine Schedule an appointment as soon as possible for a visit  Follow up with your primary care physician for outpatient recheck.  Discuss with them possible physical therapy and/or MRI for further evaluation/management. [address] [place] [id number]  [phone]      [facility] Emergency Dept Emergency Medicine  Return to the ED sooner for any new or worsening symptoms or for  any other concerns. [address] [facility] [id number] [phone]           Matias Giron MD  07/14/22 0124

## 2022-07-18 ENCOUNTER — TELEPHONE (OUTPATIENT)
Dept: ORTHOPEDICS | Facility: CLINIC | Age: 62
End: 2022-07-18
Payer: MEDICARE

## 2022-07-18 NOTE — TELEPHONE ENCOUNTER
Spoke to Pt @ 9:00am to schedule appointment for July 20 th @ 8:00am with Beatriz Carrillo for left knee pain

## 2022-07-20 ENCOUNTER — OFFICE VISIT (OUTPATIENT)
Dept: ORTHOPEDICS | Facility: CLINIC | Age: 62
End: 2022-07-20
Payer: MEDICARE

## 2022-07-20 ENCOUNTER — TELEPHONE (OUTPATIENT)
Dept: ORTHOPEDICS | Facility: CLINIC | Age: 62
End: 2022-07-20

## 2022-07-20 ENCOUNTER — HOSPITAL ENCOUNTER (OUTPATIENT)
Dept: RADIOLOGY | Facility: HOSPITAL | Age: 62
Discharge: HOME | End: 2022-07-20
Attending: NURSE PRACTITIONER
Payer: MEDICARE

## 2022-07-20 VITALS — WEIGHT: 227.13 LBS | BODY MASS INDEX: 35.65 KG/M2 | HEIGHT: 67 IN

## 2022-07-20 DIAGNOSIS — M25.562 LEFT KNEE PAIN: ICD-10-CM

## 2022-07-20 PROCEDURE — 99999 PR PBB SHADOW E&M-EST. PATIENT-LVL III: CPT | Mod: PBBFAC,,, | Performed by: NURSE PRACTITIONER

## 2022-07-20 PROCEDURE — 73721 MRI JNT OF LWR EXTRE W/O DYE: CPT | Mod: TC,PO,LT

## 2022-07-20 PROCEDURE — 99204 PR OFFICE/OUTPT VISIT, NEW, LEVL IV, 45-59 MIN: ICD-10-PCS | Mod: S$PBB,,, | Performed by: NURSE PRACTITIONER

## 2022-07-20 PROCEDURE — 99204 OFFICE O/P NEW MOD 45 MIN: CPT | Mod: S$PBB,,, | Performed by: NURSE PRACTITIONER

## 2022-07-20 PROCEDURE — 99999 PR PBB SHADOW E&M-EST. PATIENT-LVL III: ICD-10-PCS | Mod: PBBFAC,,, | Performed by: NURSE PRACTITIONER

## 2022-07-20 RX ORDER — MELOXICAM 15 MG/1
15 TABLET ORAL DAILY
Qty: 30 TABLET | Refills: 1 | Status: SHIPPED | OUTPATIENT
Start: 2022-07-20 | End: 2022-09-14

## 2022-07-20 NOTE — TELEPHONE ENCOUNTER
Called patient with MRI results and left a message letting her know that I set up an appt for her with Dr. Morocho at [facility]. Left the address and phone number of the sports medicine clinic as well.

## 2022-07-20 NOTE — PROGRESS NOTES
CC: Pain of the Left Knee      HPI: Pt with c/o left knee pain for the past 2 months. The pain is aching and global and worse if she puts her heel down. The pain started after she missed a step while walking down some steps. She has been stretching and walking to help with the pain, but she still can't put her heel flat due to pain. She was seen in the ER on 7/13 and had an xray that showed no fracture or significant djd. She was given hydrocodone and naproxen, but that did not relieve her pain. She was told to f/u in ortho clinic. She is ambulating without assistive device. There is a limp due to her not being able to put her heel down..    ROS  General: denies fever and chills  Resp: no c/o sob  CVS: no c/o cp  MSK: c/o left knee pain    PE  General: AAOx3, pleasant and cooperative  Resp: respirations even and unlabored  MSK: left knee exam  0 degrees extension  120 degrees flexion  No warmth or erythema   - effusion  Minimal crepitus  + tenderness over the medial joint line  + mat, medial  5/5 quad strength  - instability    Xray:  Reviewed xray from ER: no fracture or significant djd noted    Assessment:  Left knee injury    Plan:  MRI of the left knee for further evaluation  mobic daily  RICE  F/u results by phone     [FreeTextEntry1] : Viral GE.  stable for now. \par 1. Push appropriate fluids - Pedialyte best, not plain water. Give often. \par 2. Give one dose of ondansetron 3.5 ml, wait 1/2 hr then start fluids, bananas, rice, and chicken soup with added rice. NO medicines to stop diarrhea in child. One dose likely all that will be needed. Do not give more doses unless still vomiting. Father expresses understanding. \par 3. If looking worse or dehydrated, to [facility] or to ER for IV.\par RTO tomorrow if not better. \par (Advised to stop cereal in nightly meals and review feeding with us when she is better, for weight control).

## 2022-07-22 ENCOUNTER — TELEPHONE (OUTPATIENT)
Dept: SPORTS MEDICINE | Facility: CLINIC | Age: 62
End: 2022-07-22
Payer: MEDICARE

## 2022-07-22 DIAGNOSIS — M25.562 LEFT KNEE PAIN, UNSPECIFIED CHRONICITY: Primary | ICD-10-CM

## 2022-07-25 ENCOUNTER — OFFICE VISIT (OUTPATIENT)
Dept: SPORTS MEDICINE | Facility: CLINIC | Age: 62
End: 2022-07-25
Payer: MEDICARE

## 2022-07-25 VITALS
DIASTOLIC BLOOD PRESSURE: 75 MMHG | HEART RATE: 78 BPM | HEIGHT: 67 IN | BODY MASS INDEX: 36.04 KG/M2 | SYSTOLIC BLOOD PRESSURE: 133 MMHG | WEIGHT: 229.63 LBS

## 2022-07-25 DIAGNOSIS — S83.232A COMPLEX TEAR OF MEDIAL MENISCUS OF LEFT KNEE AS CURRENT INJURY, INITIAL ENCOUNTER: Primary | ICD-10-CM

## 2022-07-25 PROCEDURE — 99204 PR OFFICE/OUTPT VISIT, NEW, LEVL IV, 45-59 MIN: ICD-10-PCS | Mod: S$PBB,,, | Performed by: STUDENT IN AN ORGANIZED HEALTH CARE EDUCATION/TRAINING PROGRAM

## 2022-07-25 PROCEDURE — 99999 PR PBB SHADOW E&M-EST. PATIENT-LVL III: CPT | Mod: PBBFAC,,, | Performed by: STUDENT IN AN ORGANIZED HEALTH CARE EDUCATION/TRAINING PROGRAM

## 2022-07-25 PROCEDURE — 99999 PR PBB SHADOW E&M-EST. PATIENT-LVL III: ICD-10-PCS | Mod: PBBFAC,,, | Performed by: STUDENT IN AN ORGANIZED HEALTH CARE EDUCATION/TRAINING PROGRAM

## 2022-07-25 PROCEDURE — 99204 OFFICE O/P NEW MOD 45 MIN: CPT | Mod: S$PBB,,, | Performed by: STUDENT IN AN ORGANIZED HEALTH CARE EDUCATION/TRAINING PROGRAM

## 2022-07-25 PROCEDURE — 99213 OFFICE O/P EST LOW 20 MIN: CPT | Mod: PBBFAC,PN | Performed by: STUDENT IN AN ORGANIZED HEALTH CARE EDUCATION/TRAINING PROGRAM

## 2022-07-25 NOTE — PROGRESS NOTES
Subjective:          Chief Complaint: La Ly is a 62 y.o. female who had concerns including Pain of the Left Knee.    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 She states that about 2 months ago she missed a step coming down stairs causing a twisting mechanism. She states that since that event, she has had pain in the knee. She rates her pain as a 10/10 at its worst and locates the pain as over the anterior aspect of the left knee as well as some over the medial joint.  The pain has actually subsided since she had her MRI.  She now has aches and stiffness particularly after extended periods weight-bearing.  She also complains of stiffness after extended periods of immobilization such as long car rides long tenderness.  She denies any mechanical symptoms such as catching or locking.  She denies any instability or buckling to the knee.    Past Medical History:   Diagnosis Date    Eczema     Hyperlipidemia        Current Outpatient Medications on File Prior to Visit   Medication Sig Dispense Refill    atorvastatin (LIPITOR) 40 MG tablet Take 40 mg by mouth every evening.      HYDROcodone-acetaminophen (NORCO) 5-325 mg per tablet Take 1 tablet by mouth every 6 (six) hours as needed for Pain. 12 tablet 0    meloxicam (MOBIC) 15 MG tablet Take 1 tablet (15 mg total) by mouth once daily. 30 tablet 1    naproxen (NAPROSYN) 500 MG tablet Take 1 tablet (500 mg total) by mouth 2 (two) times daily with meals. 14 tablet 0    albuterol (PROVENTIL/VENTOLIN HFA) 90 mcg/actuation inhaler Inhale 1-2 puffs into the lungs every 6 (six) hours as needed for Wheezing. Rescue 1 Inhaler 0     No current facility-administered medications on file prior to visit.       Past Surgical History:   Procedure Laterality Date    COLONOSCOPY N/A 5/14/2018    Procedure: COLONOSCOPY;  Surgeon: Hayden Doherty Jr., MD;  Location: [facility];  Service: Endoscopy;   Laterality: N/A;    TUBAL LIGATION         Family History   Problem Relation Age of Onset    COPD Mother     No Known Problems Father        Social History     Socioeconomic History    Marital status:    Tobacco Use    Smoking status: Current Every Day Smoker     Packs/day: 0.50    Smokeless tobacco: Never Used    Tobacco comment: 2 cig a day   Substance and Sexual Activity    Alcohol use: No    Drug use: No    Sexual activity: Yes         Review of Systems   Constitutional: Negative.   HENT: Negative.    Eyes: Negative.    Cardiovascular: Negative.    Respiratory: Negative.    Endocrine: Negative.    Hematologic/Lymphatic: Negative.    Skin: Negative.    Musculoskeletal: Positive for joint pain (left knee) and stiffness. Negative for arthritis, back pain, falls, gout, joint swelling, muscle cramps, muscle weakness, myalgias and neck pain.   Neurological: Negative.    Psychiatric/Behavioral: Negative.    Allergic/Immunologic: Negative.                    Objective:        General: La CURIEL is well-developed, well-nourished, appears stated age, in no acute distress, alert and oriented to time, place and person.     General    Nursing note and vitals reviewed.  Constitutional: She is oriented to person, place, and time. She appears well-developed and well-nourished. No distress.   HENT:   Head: Normocephalic and atraumatic.   Nose: Nose normal.   Eyes: EOM are normal.   Cardiovascular: Intact distal pulses.    Pulmonary/Chest: Effort normal. No respiratory distress.   Neurological: She is alert and oriented to person, place, and time.   Psychiatric: She has a normal mood and affect. Her behavior is normal. Judgment and thought content normal.     General Musculoskeletal Exam   Gait: normal       Right Knee Exam     Inspection   Erythema: absent  Scars: absent  Swelling: absent  Effusion: absent  Deformity: absent  Bruising: absent    Tenderness   The patient is experiencing no tenderness.     Range of  Motion   Extension: -5   Flexion: 140     Tests   Meniscus   Arnel:  Medial - negative Lateral - negative  Ligament Examination Lachman: normal (-1 to 2mm) PCL-Posterior Drawer: normal (0 to 2mm)     MCL - Valgus: normal (0 to 2mm)  LCL - Varus: normal  Patella   Patellar apprehension: negative  Passive Patellar Tilt: neutral  Patellar Tracking: normal  Patellar Grind: negative    Other   Sensation: normal    Left Knee Exam     Inspection   Erythema: absent  Scars: absent  Swelling: absent  Effusion: absent  Deformity: absent  Bruising: absent    Tenderness   The patient tender to palpation of the medial joint line.    Range of Motion   Extension: -5   Flexion: 140     Tests   Meniscus   Arnel:  Medial - positive (For pain only) Lateral - negative  Stability Lachman: normal (-1 to 2mm) PCL-Posterior Drawer: normal (0 to 2mm)  MCL - Valgus: normal (0 to 2mm)  LCL - Varus: normal (0 to 2mm)  Patella   Patellar apprehension: negative  Passive Patellar Tilt: neutral  Patellar Tracking: normal  Patellar Grind: negative    Other   Sensation: normal    Muscle Strength   Right Lower Extremity   Quadriceps:  5/5   Hamstrin/5   Left Lower Extremity   Quadriceps:  5/5   Hamstrin/5     Vascular Exam     Right Pulses  Dorsalis Pedis:      2+  Posterior Tibial:      2+        Left Pulses  Dorsalis Pedis:      2+  Posterior Tibial:      2+        Imaging:  X-rays of the knees from 2022 personally reviewed by me on that day.  These include weight-bearing AP, PA flexion, lateral, and Merchant views.  There is mild arthritic changes throughout the knee but overall the joint spaces are well preserved.    MRI of the left knee 2022 personally reviewed by me 2022.  There is thinning of the articular cartilage in the medial compartment as well as the patellofemoral compartment.  There is a degenerative-type tear of the posterior horn of the medial meniscus.  This does not extend posterior root.  Lateral  meniscus including root was intact.  The cruciate and collateral ligaments are intact.            Assessment:     La Ly is a 62 y.o. female with tear of the left posterior horn medial meniscus  Encounter Diagnosis   Name Primary?    Complex tear of medial meniscus of left knee as current injury, initial encounter Yes          Plan:       Diagnosis and treatment options were discussed at length with the patient all her questions were answered.  I showed her the x-rays and the MRI and explained the findings to her.  We discussed treatment options for meniscus tears.  She really has not had any treatment for this.  I recommended we begin with physical therapy.  She is in agreement with this.  Also discussed injections including the risks and benefits of corticosteroid injections.  Since her pain is not too severe we mutually agreed to defer this for now.  She return to clinic in 3 months for re-evaluation.    All of their questions were answered.  They will call the clinic with any questions or concerns in the interim.    Should the patient's symptoms worsen, persist, or fail to improve they should return for reevaluation and I would be happy to see them back anytime.        Karl Morocho M.D.     Please be aware that this note has been generated with the assistance of [employer] voice-to-text.  Please excuse any spelling or grammatical errors.    Thank you for choosing Dr. Karl Morocho for your sports medicine care. It is our goal to provide you with exceptional care that will help keep you healthy, active, and get you back in the game.     If you felt that you received exemplary care today, please consider leaving feedback for Dr. Morocho on [employer] at [email or web address].    Please do not hesitate to reach out to us via email, phone, or MyChart with any questions, concerns, or feedback.

## 2022-09-20 ENCOUNTER — LAB VISIT (OUTPATIENT)
Dept: LAB | Facility: HOSPITAL | Age: 62
End: 2022-09-20
Attending: DERMATOLOGY
Payer: MEDICARE

## 2022-09-20 DIAGNOSIS — L29.8 OTHER PRURITUS: Primary | ICD-10-CM

## 2022-09-20 LAB
ALBUMIN SERPL BCP-MCNC: 4.4 G/DL (ref 3.5–5.2)
ALP SERPL-CCNC: 59 U/L (ref 38–126)
ALT SERPL W/O P-5'-P-CCNC: 28 U/L (ref 10–44)
ANION GAP SERPL CALC-SCNC: 9 MMOL/L (ref 8–16)
AST SERPL-CCNC: 25 U/L (ref 15–46)
BASOPHILS # BLD AUTO: 0.03 K/UL (ref 0–0.2)
BASOPHILS NFR BLD: 0.2 % (ref 0–1.9)
BILIRUB SERPL-MCNC: 0.3 MG/DL (ref 0.1–1)
CALCIUM SERPL-MCNC: 9.5 MG/DL (ref 8.7–10.5)
CHLORIDE SERPL-SCNC: 104 MMOL/L (ref 95–110)
CO2 SERPL-SCNC: 28 MMOL/L (ref 23–29)
CREAT SERPL-MCNC: 0.82 MG/DL (ref 0.5–1.4)
DIFFERENTIAL METHOD: ABNORMAL
EOSINOPHIL # BLD AUTO: 0 K/UL (ref 0–0.5)
EOSINOPHIL NFR BLD: 0.1 % (ref 0–8)
ERYTHROCYTE [DISTWIDTH] IN BLOOD BY AUTOMATED COUNT: 12.3 % (ref 11.5–14.5)
EST. GFR  (NO RACE VARIABLE): >60 ML/MIN/1.73 M^2
GLUCOSE SERPL-MCNC: 117 MG/DL (ref 70–110)
HBV CORE IGM SERPL QL IA: NORMAL
HBV SURFACE AB SER-ACNC: <3 MIU/ML
HBV SURFACE AB SER-ACNC: NORMAL M[IU]/ML
HBV SURFACE AG SERPL QL IA: NORMAL
HCT VFR BLD AUTO: 41.1 % (ref 37–48.5)
HCV AB SERPL QL IA: NORMAL
HGB BLD-MCNC: 13.4 G/DL (ref 12–16)
IMM GRANULOCYTES # BLD AUTO: 0.09 K/UL (ref 0–0.04)
IMM GRANULOCYTES NFR BLD AUTO: 0.7 % (ref 0–0.5)
LYMPHOCYTES # BLD AUTO: 1.4 K/UL (ref 1–4.8)
LYMPHOCYTES NFR BLD: 10.3 % (ref 18–48)
MCH RBC QN AUTO: 30 PG (ref 27–31)
MCHC RBC AUTO-ENTMCNC: 32.6 G/DL (ref 32–36)
MCV RBC AUTO: 92 FL (ref 82–98)
MONOCYTES # BLD AUTO: 0.9 K/UL (ref 0.3–1)
MONOCYTES NFR BLD: 6.4 % (ref 4–15)
NEUTROPHILS # BLD AUTO: 11 K/UL (ref 1.8–7.7)
NEUTROPHILS NFR BLD: 82.3 % (ref 38–73)
NRBC BLD-RTO: 0 /100 WBC
PLATELET # BLD AUTO: 265 K/UL (ref 150–450)
PMV BLD AUTO: 10.9 FL (ref 9.2–12.9)
POTASSIUM SERPL-SCNC: 4.3 MMOL/L (ref 3.5–5.1)
PROT SERPL-MCNC: 6.9 G/DL (ref 6–8.4)
RBC # BLD AUTO: 4.47 M/UL (ref 4–5.4)
SODIUM SERPL-SCNC: 141 MMOL/L (ref 136–145)
UUN UR-MCNC: 15 MG/DL (ref 7–17)
WBC # BLD AUTO: 13.34 K/UL (ref 3.9–12.7)

## 2022-09-20 PROCEDURE — 86803 HEPATITIS C AB TEST: CPT | Mod: PO | Performed by: DERMATOLOGY

## 2022-09-20 PROCEDURE — 80048 BASIC METABOLIC PNL TOTAL CA: CPT | Mod: PO | Performed by: DERMATOLOGY

## 2022-09-20 PROCEDURE — 85025 COMPLETE CBC W/AUTO DIFF WBC: CPT | Mod: PO | Performed by: DERMATOLOGY

## 2022-09-20 PROCEDURE — 36415 COLL VENOUS BLD VENIPUNCTURE: CPT | Mod: PO | Performed by: DERMATOLOGY

## 2022-09-20 PROCEDURE — 87340 HEPATITIS B SURFACE AG IA: CPT | Mod: PO | Performed by: DERMATOLOGY

## 2022-09-20 PROCEDURE — 86705 HEP B CORE ANTIBODY IGM: CPT | Mod: PO | Performed by: DERMATOLOGY

## 2022-09-20 PROCEDURE — 86706 HEP B SURFACE ANTIBODY: CPT | Mod: 91,PO | Performed by: DERMATOLOGY

## 2022-09-20 PROCEDURE — 86480 TB TEST CELL IMMUN MEASURE: CPT | Mod: PO | Performed by: DERMATOLOGY

## 2022-09-20 PROCEDURE — 80076 HEPATIC FUNCTION PANEL: CPT | Mod: PO | Performed by: DERMATOLOGY

## 2022-09-22 LAB
GAMMA INTERFERON BACKGROUND BLD IA-ACNC: 0.02 IU/ML
M TB IFN-G CD4+ BCKGRND COR BLD-ACNC: -0 IU/ML
MITOGEN IGNF BCKGRD COR BLD-ACNC: 4.15 IU/ML
TB GOLD PLUS: NEGATIVE
TB2 - NIL: 0.01 IU/ML

## 2022-09-30 ENCOUNTER — TELEPHONE (OUTPATIENT)
Dept: SPORTS MEDICINE | Facility: CLINIC | Age: 62
End: 2022-09-30
Payer: MEDICARE

## 2022-09-30 NOTE — TELEPHONE ENCOUNTER
Left VM with patient in attempt to reschedule clinic appointment on 10/24/22 due to provider being out. Asked patient to call back to discuss.

## 2022-10-12 ENCOUNTER — TELEPHONE (OUTPATIENT)
Dept: SPORTS MEDICINE | Facility: CLINIC | Age: 62
End: 2022-10-12
Payer: MEDICARE

## 2022-10-25 ENCOUNTER — OFFICE VISIT (OUTPATIENT)
Dept: SPORTS MEDICINE | Facility: CLINIC | Age: 62
End: 2022-10-25
Payer: MEDICARE

## 2022-10-25 VITALS — HEIGHT: 67 IN | BODY MASS INDEX: 35.95 KG/M2 | WEIGHT: 229.06 LBS

## 2022-10-25 DIAGNOSIS — S83.232A COMPLEX TEAR OF MEDIAL MENISCUS OF LEFT KNEE AS CURRENT INJURY, INITIAL ENCOUNTER: Primary | ICD-10-CM

## 2022-10-25 PROCEDURE — 99213 PR OFFICE/OUTPT VISIT, EST, LEVL III, 20-29 MIN: ICD-10-PCS | Mod: S$GLB,,, | Performed by: STUDENT IN AN ORGANIZED HEALTH CARE EDUCATION/TRAINING PROGRAM

## 2022-10-25 PROCEDURE — 3044F PR MOST RECENT HEMOGLOBIN A1C LEVEL <7.0%: ICD-10-PCS | Mod: CPTII,S$GLB,, | Performed by: STUDENT IN AN ORGANIZED HEALTH CARE EDUCATION/TRAINING PROGRAM

## 2022-10-25 PROCEDURE — 1160F PR REVIEW ALL MEDS BY PRESCRIBER/CLIN PHARMACIST DOCUMENTED: ICD-10-PCS | Mod: CPTII,S$GLB,, | Performed by: STUDENT IN AN ORGANIZED HEALTH CARE EDUCATION/TRAINING PROGRAM

## 2022-10-25 PROCEDURE — 1160F RVW MEDS BY RX/DR IN RCRD: CPT | Mod: CPTII,S$GLB,, | Performed by: STUDENT IN AN ORGANIZED HEALTH CARE EDUCATION/TRAINING PROGRAM

## 2022-10-25 PROCEDURE — 1159F PR MEDICATION LIST DOCUMENTED IN MEDICAL RECORD: ICD-10-PCS | Mod: CPTII,S$GLB,, | Performed by: STUDENT IN AN ORGANIZED HEALTH CARE EDUCATION/TRAINING PROGRAM

## 2022-10-25 PROCEDURE — 99999 PR PBB SHADOW E&M-EST. PATIENT-LVL III: ICD-10-PCS | Mod: PBBFAC,,, | Performed by: STUDENT IN AN ORGANIZED HEALTH CARE EDUCATION/TRAINING PROGRAM

## 2022-10-25 PROCEDURE — 99999 PR PBB SHADOW E&M-EST. PATIENT-LVL III: CPT | Mod: PBBFAC,,, | Performed by: STUDENT IN AN ORGANIZED HEALTH CARE EDUCATION/TRAINING PROGRAM

## 2022-10-25 PROCEDURE — 99213 OFFICE O/P EST LOW 20 MIN: CPT | Mod: S$GLB,,, | Performed by: STUDENT IN AN ORGANIZED HEALTH CARE EDUCATION/TRAINING PROGRAM

## 2022-10-25 PROCEDURE — 3044F HG A1C LEVEL LT 7.0%: CPT | Mod: CPTII,S$GLB,, | Performed by: STUDENT IN AN ORGANIZED HEALTH CARE EDUCATION/TRAINING PROGRAM

## 2022-10-25 PROCEDURE — 1159F MED LIST DOCD IN RCRD: CPT | Mod: CPTII,S$GLB,, | Performed by: STUDENT IN AN ORGANIZED HEALTH CARE EDUCATION/TRAINING PROGRAM

## 2022-10-25 RX ORDER — METHOTREXATE 2.5 MG/1
TABLET ORAL
COMMUNITY
Start: 2022-10-17

## 2022-10-25 NOTE — PROGRESS NOTES
Subjective:          Chief Complaint: La yL is a 62 y.o. female who had concerns including Follow-up of the Left Knee.    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  She was last seen 3 months ago.  She was diagnosed with a medial meniscus tear and was referred to physical therapy.  We did not administer corticosteroid injections or pain is not too severe.  She feels at least 60% improved since her last visit with just physical therapy.  She is making good progress.  Occasionally has medial-sided aches in her knee mainly after extended periods of weight-bearing or mobilization.  Still has some stiffness.  Denies any mechanical symptoms such as locking or catching.  Denies any instability.  She is pleased with the progress she has made physical therapy.    Past Medical History:   Diagnosis Date    Eczema     Hyperlipidemia        Current Outpatient Medications on File Prior to Visit   Medication Sig Dispense Refill    atorvastatin (LIPITOR) 40 MG tablet Take 40 mg by mouth every evening.      meloxicam (MOBIC) 15 MG tablet TAKE 1 TABLET BY MOUTH EVERY DAY 30 tablet 1    methotrexate 2.5 MG Tab Take by mouth.      naproxen (NAPROSYN) 500 MG tablet Take 1 tablet (500 mg total) by mouth 2 (two) times daily with meals. 14 tablet 0    albuterol (PROVENTIL/VENTOLIN HFA) 90 mcg/actuation inhaler Inhale 1-2 puffs into the lungs every 6 (six) hours as needed for Wheezing. Rescue 1 Inhaler 0    HYDROcodone-acetaminophen (NORCO) 5-325 mg per tablet Take 1 tablet by mouth every 6 (six) hours as needed for Pain. (Patient not taking: Reported on 10/25/2022) 12 tablet 0     No current facility-administered medications on file prior to visit.       Past Surgical History:   Procedure Laterality Date    COLONOSCOPY N/A 5/14/2018    Procedure: COLONOSCOPY;  Surgeon: Hayden Doherty Jr., MD;  Location: [facility];  Service: Endoscopy;  Laterality: N/A;     TUBAL LIGATION         Family History   Problem Relation Age of Onset    COPD Mother     No Known Problems Father        Social History     Socioeconomic History    Marital status:    Tobacco Use    Smoking status: Every Day     Packs/day: 0.50     Types: Cigarettes    Smokeless tobacco: Never    Tobacco comments:     2 cig a day   Substance and Sexual Activity    Alcohol use: No    Drug use: No    Sexual activity: Yes         Review of Systems   Constitutional: Negative.   HENT: Negative.     Eyes: Negative.    Cardiovascular: Negative.    Respiratory: Negative.     Endocrine: Negative.    Hematologic/Lymphatic: Negative.    Skin: Negative.    Musculoskeletal:  Positive for joint pain (left knee) and stiffness. Negative for arthritis, back pain, falls, gout, joint swelling, muscle cramps, muscle weakness, myalgias and neck pain.   Neurological: Negative.    Psychiatric/Behavioral: Negative.     Allergic/Immunologic: Negative.                  Objective:        General: La CURIEL is well-developed, well-nourished, appears stated age, in no acute distress, alert and oriented to time, place and person.     General    Nursing note and vitals reviewed.  Constitutional: She is oriented to person, place, and time. She appears well-developed and well-nourished. No distress.   HENT:   Head: Normocephalic and atraumatic.   Nose: Nose normal.   Eyes: EOM are normal.   Cardiovascular:  Intact distal pulses.            Pulmonary/Chest: Effort normal. No respiratory distress.   Neurological: She is alert and oriented to person, place, and time.   Psychiatric: She has a normal mood and affect. Her behavior is normal. Judgment and thought content normal.     General Musculoskeletal Exam   Gait: normal       Right Knee Exam     Inspection   Erythema: absent  Scars: absent  Swelling: absent  Effusion: absent  Deformity: absent  Bruising: absent    Tenderness   The patient is experiencing no tenderness.     Range of Motion    Extension:  -5   Flexion:  140     Tests   Meniscus   Arnel:  Medial - negative Lateral - negative  Ligament Examination   Lachman: normal (-1 to 2mm)   PCL-Posterior Drawer: normal (0 to 2mm)     MCL - Valgus: normal (0 to 2mm)  LCL - Varus: normal  Patella   Patellar apprehension: negative  Passive Patellar Tilt: neutral  Patellar Tracking: normal  Patellar Grind: negative    Other   Sensation: normal    Left Knee Exam     Inspection   Erythema: absent  Scars: absent  Swelling: absent  Effusion: absent  Deformity: present (varus)  Bruising: absent    Tenderness   The patient tender to palpation of the medial joint line.    Range of Motion   Extension:  -5   Flexion:  140     Tests   Meniscus   Arnel:  Medial - positive (For pain only) Lateral - negative  Stability   Lachman: normal (-1 to 2mm)   PCL-Posterior Drawer: normal (0 to 2mm)  MCL - Valgus: normal (0 to 2mm)  LCL - Varus: normal (0 to 2mm)  Patella   Patellar apprehension: negative  Passive Patellar Tilt: neutral  Patellar Tracking: normal  Patellar Grind: negative    Other   Sensation: normal    Muscle Strength   Right Lower Extremity   Quadriceps:  5/5   Hamstrin/5   Left Lower Extremity   Quadriceps:  5/5   Hamstrin/5     Vascular Exam     Right Pulses  Dorsalis Pedis:      2+  Posterior Tibial:      2+        Left Pulses  Dorsalis Pedis:      2+  Posterior Tibial:      2+    Imaging:  X-rays of the knees from 2022 personally reviewed by me on that day.  These include weight-bearing AP, PA flexion, lateral, and Merchant views.  There is mild arthritic changes throughout the knee but overall the joint spaces are well preserved.    MRI of the left knee 2022 personally reviewed by me 2022.  There is thinning of the articular cartilage in the medial compartment as well as the patellofemoral compartment.  There is a degenerative-type tear of the posterior horn of the medial meniscus.  This does not extend posterior root.   Lateral meniscus including root was intact.  The cruciate and collateral ligaments are intact.            Assessment:     La Ly is a 62 y.o. female with tear of the left posterior horn medial meniscus  Encounter Diagnosis   Name Primary?    Complex tear of medial meniscus of left knee as current injury, initial encounter Yes          Plan:       She has done well physical therapy.  This point we will continue with physical therapy.  I do not think she needs a corticosteroid injection at this time she is in agreement with this.  Return to clinic in 3 months for repeat evaluation    All of their questions were answered.  They will call the clinic with any questions or concerns in the interim.    Should the patient's symptoms worsen, persist, or fail to improve they should return for reevaluation and I would be happy to see them back anytime.        Karl Morocho M.D.    Please be aware that this note has been generated with the assistance of [employer] voice-to-text.  Please excuse any spelling or grammatical errors.    Thank you for choosing Dr. Karl Morocho for your sports medicine care. It is our goal to provide you with exceptional care that will help keep you healthy, active, and get you back in the game.     If you felt that you received exemplary care today, please consider leaving feedback for Dr. Morocho on [employer] at [email or web address].    Please do not hesitate to reach out to us via email, phone, or MyChart with any questions, concerns, or feedback.

## 2023-01-01 ENCOUNTER — HOSPITAL ENCOUNTER (EMERGENCY)
Facility: HOSPITAL | Age: 63
Discharge: HOME | End: 2023-01-01
Attending: EMERGENCY MEDICINE
Payer: MEDICARE

## 2023-01-01 VITALS
HEIGHT: 67 IN | DIASTOLIC BLOOD PRESSURE: 99 MMHG | BODY MASS INDEX: 35.31 KG/M2 | OXYGEN SATURATION: 99 % | RESPIRATION RATE: 16 BRPM | HEART RATE: 72 BPM | SYSTOLIC BLOOD PRESSURE: 141 MMHG | TEMPERATURE: 98 F | WEIGHT: 225 LBS

## 2023-01-01 DIAGNOSIS — M79.641 BILATERAL HAND PAIN: Primary | ICD-10-CM

## 2023-01-01 DIAGNOSIS — M79.642 BILATERAL HAND PAIN: Primary | ICD-10-CM

## 2023-01-01 PROCEDURE — 99284 EMERGENCY DEPT VISIT MOD MDM: CPT

## 2023-01-01 PROCEDURE — 96372 THER/PROPH/DIAG INJ SC/IM: CPT | Performed by: NURSE PRACTITIONER

## 2023-01-01 PROCEDURE — 63600175 PHARM REV CODE 636 W HCPCS: Performed by: NURSE PRACTITIONER

## 2023-01-01 PROCEDURE — 25000003 PHARM REV CODE 250: Performed by: NURSE PRACTITIONER

## 2023-01-01 RX ORDER — DIPHENHYDRAMINE HCL 50 MG
50 CAPSULE ORAL EVERY 6 HOURS PRN
Qty: 20 CAPSULE | Refills: 0 | Status: SHIPPED | OUTPATIENT
Start: 2023-01-01

## 2023-01-01 RX ORDER — METHYLPREDNISOLONE SOD SUCC 125 MG
125 VIAL (EA) INJECTION
Status: COMPLETED | OUTPATIENT
Start: 2023-01-01 | End: 2023-01-01

## 2023-01-01 RX ORDER — DIPHENHYDRAMINE HCL 25 MG
25 CAPSULE ORAL
Status: COMPLETED | OUTPATIENT
Start: 2023-01-01 | End: 2023-01-01

## 2023-01-01 RX ADMIN — DIPHENHYDRAMINE HYDROCHLORIDE 25 MG: 25 CAPSULE ORAL at 12:01

## 2023-01-01 RX ADMIN — METHYLPREDNISOLONE SODIUM SUCCINATE 125 MG: 125 INJECTION, POWDER, FOR SOLUTION INTRAMUSCULAR; INTRAVENOUS at 12:01

## 2023-01-01 NOTE — ED NOTES
Patient presents to the ER with CO chronic eczema that is currently active on both hands.  States pain is 10/10

## 2023-01-01 NOTE — ED PROVIDER NOTES
Encounter Date: 1/1/2023    SCRIBE #1 NOTE: I, Pam Piedra, am scribing for, and in the presence of,  MARLEY Tate. I have scribed the entire note.     History     Chief Complaint   Patient presents with    Hand Pain     Skin on both hands is dry and painful and has been peeling. Hands are also itching. Symptome started after restarting methotrexate.      The patient is a 63 y/o female with a pmhx of Eczema presents to the emergency department for bilateral hand itching and peeling that began 2 weeks ago.  She has had this issue for several years and states it comes and goes and has been told it is due to eczema. The patient reports this current episode of peeling began two weeks ago.  She has been applying Betamethasone cream for one week w/o any relief of sx. She also reports she was placed on Methotrexate on (10/17/22) by her provider and she takes seven capsules every Monday. The patient is also taking folic acid pills. The patient has been coating her hands in vaseline and wearing gloves without improvement.  states that she has taken a steroid injection for these symptoms in the past and is requesting another steroid injection. No other complaints at this time.  She denies change in medication, new exposures, and new cosmetics.  Peeling skin is only on the palmar surface of her hands.    The history is provided by the patient.   Review of patient's allergies indicates:  No Known Allergies  Past Medical History:   Diagnosis Date    Eczema     Hyperlipidemia      Past Surgical History:   Procedure Laterality Date    COLONOSCOPY N/A 5/14/2018    Procedure: COLONOSCOPY;  Surgeon: Hayden Doherty Jr., MD;  Location: [facility];  Service: Endoscopy;  Laterality: N/A;    TUBAL LIGATION       Family History   Problem Relation Age of Onset    COPD Mother     No Known Problems Father      Social History     Tobacco Use    Smoking status: Every Day     Packs/day: 0.50     Types: Cigarettes     "Smokeless tobacco: Never    Tobacco comments:     2 cig a day   Substance Use Topics    Alcohol use: No    Drug use: No     Review of Systems   Constitutional:  Negative for activity change, fatigue and fever.   HENT:  Negative for congestion.    Respiratory:  Negative for cough.    Musculoskeletal:  Negative for arthralgias and myalgias.        +bilateral hand pain   Skin:         "Peeling" skin on hands   Allergic/Immunologic: Negative for immunocompromised state.   Neurological:  Negative for weakness and numbness.   Psychiatric/Behavioral:  Negative for confusion.      Physical Exam     Initial Vitals [01/01/23 1115]   BP Pulse Resp Temp SpO2   (!) 141/99 72 16 97.8 °F (36.6 °C) 99 %      MAP       --         Physical Exam    Nursing note and vitals reviewed.  Constitutional: Vital signs are normal. She appears well-developed and well-nourished. She is active and cooperative.  Non-toxic appearance. She does not have a sickly appearance. She does not appear ill.   HENT:   Head: Normocephalic and atraumatic.   Eyes: Conjunctivae and EOM are normal.   Neck: Neck supple.   Normal range of motion.  Cardiovascular:  Normal rate.           Pulmonary/Chest: Effort normal.   Abdominal: Abdomen is soft. Bowel sounds are normal.   Musculoskeletal:      Cervical back: Normal range of motion and neck supple.     Neurological: She is alert and oriented to person, place, and time. She has normal strength. GCS eye subscore is 4. GCS verbal subscore is 5. GCS motor subscore is 6.   Skin: Skin is warm, dry and intact. Capillary refill takes less than 2 seconds. No lesion, no rash and no abscess noted. No erythema.   Peeling skin palmar surface bilateral hands and fingers. Overlying excoriations. No drainage or significant erythema.    Psychiatric: She has a normal mood and affect. Her speech is normal and behavior is normal. Judgment and thought content normal. Cognition and memory are normal.       ED Course   Procedures  Labs " Reviewed - No data to display       Imaging Results    None          Medications   diphenhydrAMINE capsule 25 mg (25 mg Oral Given 1/1/23 1207)   methylPREDNISolone sodium succinate injection 125 mg (125 mg Intramuscular Given 1/1/23 1215)     Medical Decision Making:   History:   Old Medical Records: I decided to obtain old medical records.  Old Records Summarized: other records.       <> Summary of Records: Attempted to review PCP records- none found  Initial Assessment:   62-year-old female presents to the ED for itching and peeling of her bilateral hands.  She states that she has had this issue intermittently for the past few years.  She states that she has been diagnosed with eczema.  She has been prescribed methotrexate and betamethasone cream which she has been using as prescribed.  Patient is well appearing.  Afebrile.  She has peeling skin to bilateral hands on the palmar surface.  No sign of infection.  The patient is requesting a steroid injection to help with symptoms.  Differential Diagnosis:   Eczema, contact dermatitis, cellulitis, medication reaction  ED Management:  No indication for labs or imaging at this time.  The patient reports that she has had these symptoms for several years.  She states that she has been given a steroid injection when symptoms are this severe in the past and it has significantly helped.  The patient was given Benadryl and Solu-Medrol while in the ED. advised her to continue her previously prescribed medications as directed.  She is to follow up with her provider within 1 week.  She has no sign of infection.  I reviewed return precautions.  The patient verbalized understanding, compliance, and agreement with treatment plan.  She reports that she feels comfortable with the treatment plan.                        Clinical Impression:   Final diagnoses:  [M79.641, M79.642] Bilateral hand pain (Primary)        ED Disposition Condition    Discharge Stable          ED Prescriptions        Medication Sig Dispense Start Date End Date Auth. Provider    diphenhydrAMINE (BENADRYL) 50 MG capsule Take 1 capsule (50 mg total) by mouth every 6 (six) hours as needed for Itching or Allergies. 20 capsule 1/1/2023 -- MARLEY Andres          Follow-up Information       Follow up With Specialties Details Why Contact Info    Dacia Ayon MD Internal Medicine Schedule an appointment as soon as possible for a visit in 1 week  [address] [place] [address]  [address]  [facility] [address]  [phone]          I, Kirsty ROACH, personally performed the services described in this documentation. All medical record entries made by the scribe were at my direction and in my presence.  I have reviewed the chart and agree that the record reflects my personal performance and is accurate and complete.     MARLEY Dash-BC  4:29 PM 01/01/2023       MARLEY Andres  01/01/23 0474

## 2023-01-01 NOTE — DISCHARGE INSTRUCTIONS
Continue your medications pas prescribed.     The patient's blood pressure was noted to be elevated while in the ED today.  The patient has no associated signs or symptoms of hypertension.  Patient's blood pressure is likely elevated due to situation.  Advised blood pressure recheck by PCP within 1 week.    Thank you for choosing [facility]! We appreciate you coming to us for your medical care. We hope you feel better soon! Please come back to [facility] for all of your future medical needs.      Our goal in the emergency department is to always give you outstanding care and exceptional service. You may receive a survey by mail or e-mail in the next week regarding your experience in our ED. We would greatly appreciate your completing and returning the survey. Your feedback provides us with a way to recognize our staff who give very good care and it helps us learn how to improve when your experience was below our aspiration of excellence.      Sincerely,  TIMO Dash  Lead [facility]

## 2023-01-09 ENCOUNTER — OFFICE VISIT (OUTPATIENT)
Dept: SPORTS MEDICINE | Facility: CLINIC | Age: 63
End: 2023-01-09
Payer: MEDICARE

## 2023-01-09 VITALS — BODY MASS INDEX: 36 KG/M2 | WEIGHT: 229.38 LBS | HEIGHT: 67 IN

## 2023-01-09 DIAGNOSIS — S83.232A COMPLEX TEAR OF MEDIAL MENISCUS OF LEFT KNEE AS CURRENT INJURY, INITIAL ENCOUNTER: Primary | ICD-10-CM

## 2023-01-09 PROCEDURE — 99999 PR PBB SHADOW E&M-EST. PATIENT-LVL III: ICD-10-PCS | Mod: PBBFAC,,, | Performed by: STUDENT IN AN ORGANIZED HEALTH CARE EDUCATION/TRAINING PROGRAM

## 2023-01-09 PROCEDURE — 99999 PR PBB SHADOW E&M-EST. PATIENT-LVL III: CPT | Mod: PBBFAC,,, | Performed by: STUDENT IN AN ORGANIZED HEALTH CARE EDUCATION/TRAINING PROGRAM

## 2023-01-09 PROCEDURE — 3008F BODY MASS INDEX DOCD: CPT | Mod: CPTII,S$GLB,, | Performed by: STUDENT IN AN ORGANIZED HEALTH CARE EDUCATION/TRAINING PROGRAM

## 2023-01-09 PROCEDURE — 1159F MED LIST DOCD IN RCRD: CPT | Mod: CPTII,S$GLB,, | Performed by: STUDENT IN AN ORGANIZED HEALTH CARE EDUCATION/TRAINING PROGRAM

## 2023-01-09 PROCEDURE — 1160F RVW MEDS BY RX/DR IN RCRD: CPT | Mod: CPTII,S$GLB,, | Performed by: STUDENT IN AN ORGANIZED HEALTH CARE EDUCATION/TRAINING PROGRAM

## 2023-01-09 PROCEDURE — 1160F PR REVIEW ALL MEDS BY PRESCRIBER/CLIN PHARMACIST DOCUMENTED: ICD-10-PCS | Mod: CPTII,S$GLB,, | Performed by: STUDENT IN AN ORGANIZED HEALTH CARE EDUCATION/TRAINING PROGRAM

## 2023-01-09 PROCEDURE — 99213 OFFICE O/P EST LOW 20 MIN: CPT | Mod: S$GLB,,, | Performed by: STUDENT IN AN ORGANIZED HEALTH CARE EDUCATION/TRAINING PROGRAM

## 2023-01-09 PROCEDURE — 1159F PR MEDICATION LIST DOCUMENTED IN MEDICAL RECORD: ICD-10-PCS | Mod: CPTII,S$GLB,, | Performed by: STUDENT IN AN ORGANIZED HEALTH CARE EDUCATION/TRAINING PROGRAM

## 2023-01-09 PROCEDURE — 99213 PR OFFICE/OUTPT VISIT, EST, LEVL III, 20-29 MIN: ICD-10-PCS | Mod: S$GLB,,, | Performed by: STUDENT IN AN ORGANIZED HEALTH CARE EDUCATION/TRAINING PROGRAM

## 2023-01-09 PROCEDURE — 3008F PR BODY MASS INDEX (BMI) DOCUMENTED: ICD-10-PCS | Mod: CPTII,S$GLB,, | Performed by: STUDENT IN AN ORGANIZED HEALTH CARE EDUCATION/TRAINING PROGRAM

## 2023-01-09 NOTE — PROGRESS NOTES
Subjective:          Chief Complaint: La Ly is a 62 y.o. female who had concerns including Follow-up of the Left Knee.    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  She was last seen 3 months ago.  She was diagnosed with a medial meniscus tear and was referred to physical therapy.  We did not administer corticosteroid injections or pain is not too severe.  She feels 100% improved.  She has made great progress.  She is pain free. She stopped physical therapy and has continued with her own exercise at her gym. She is pleased with this.  Denies any mechanical symptoms such as locking or catching.  Denies any instability.  She is pleased with the progress she has made physical therapy.    Past Medical History:   Diagnosis Date    Eczema     Hyperlipidemia        Current Outpatient Medications on File Prior to Visit   Medication Sig Dispense Refill    atorvastatin (LIPITOR) 40 MG tablet Take 40 mg by mouth every evening.      diphenhydrAMINE (BENADRYL) 50 MG capsule Take 1 capsule (50 mg total) by mouth every 6 (six) hours as needed for Itching or Allergies. 20 capsule 0    meloxicam (MOBIC) 15 MG tablet TAKE 1 TABLET BY MOUTH EVERY DAY 30 tablet 1    methotrexate 2.5 MG Tab Take by mouth.      naproxen (NAPROSYN) 500 MG tablet Take 1 tablet (500 mg total) by mouth 2 (two) times daily with meals. 14 tablet 0    albuterol (PROVENTIL/VENTOLIN HFA) 90 mcg/actuation inhaler Inhale 1-2 puffs into the lungs every 6 (six) hours as needed for Wheezing. Rescue 1 Inhaler 0    HYDROcodone-acetaminophen (NORCO) 5-325 mg per tablet Take 1 tablet by mouth every 6 (six) hours as needed for Pain. (Patient not taking: Reported on 10/25/2022) 12 tablet 0     No current facility-administered medications on file prior to visit.       Past Surgical History:   Procedure Laterality Date    COLONOSCOPY N/A 5/14/2018    Procedure: COLONOSCOPY;  Surgeon: Hayden CARVALHO  Chas Bee MD;  Location: [facility];  Service: Endoscopy;  Laterality: N/A;    TUBAL LIGATION         Family History   Problem Relation Age of Onset    COPD Mother     No Known Problems Father        Social History     Socioeconomic History    Marital status:    Tobacco Use    Smoking status: Every Day     Packs/day: 0.50     Types: Cigarettes    Smokeless tobacco: Never    Tobacco comments:     2 cig a day   Substance and Sexual Activity    Alcohol use: No    Drug use: No    Sexual activity: Yes         Review of Systems   Constitutional: Negative.   HENT: Negative.     Eyes: Negative.    Cardiovascular: Negative.    Respiratory: Negative.     Endocrine: Negative.    Hematologic/Lymphatic: Negative.    Skin: Negative.    Musculoskeletal:  Negative for arthritis, back pain, falls, gout, joint pain (left knee), joint swelling, muscle cramps, muscle weakness, myalgias, neck pain and stiffness.   Neurological: Negative.    Psychiatric/Behavioral: Negative.     Allergic/Immunologic: Negative.                  Objective:        General: La CURIEL is well-developed, well-nourished, appears stated age, in no acute distress, alert and oriented to time, place and person.     General    Nursing note and vitals reviewed.  Constitutional: She is oriented to person, place, and time. She appears well-developed and well-nourished. No distress.   HENT:   Head: Normocephalic and atraumatic.   Nose: Nose normal.   Eyes: EOM are normal.   Cardiovascular:  Intact distal pulses.            Pulmonary/Chest: Effort normal. No respiratory distress.   Neurological: She is alert and oriented to person, place, and time.   Psychiatric: She has a normal mood and affect. Her behavior is normal. Judgment and thought content normal.     General Musculoskeletal Exam   Gait: normal       Right Knee Exam     Inspection   Erythema: absent  Scars: absent  Swelling: absent  Effusion: absent  Deformity: absent  Bruising: absent    Tenderness    The patient is experiencing no tenderness.     Range of Motion   Extension:  -5   Flexion:  140     Tests   Meniscus   Arnel:  Medial - negative Lateral - negative  Ligament Examination   Lachman: normal (-1 to 2mm)   PCL-Posterior Drawer: normal (0 to 2mm)     MCL - Valgus: normal (0 to 2mm)  LCL - Varus: normal  Patella   Patellar apprehension: negative  Passive Patellar Tilt: neutral  Patellar Tracking: normal  Patellar Grind: negative    Other   Sensation: normal    Left Knee Exam     Inspection   Erythema: absent  Scars: absent  Swelling: absent  Effusion: absent  Deformity: present (varus)  Bruising: absent    Tenderness   The patient is experiencing no tenderness.     Range of Motion   Extension:  -5   Flexion:  140     Tests   Meniscus   Arnel:  Medial - negative (For pain only) Lateral - negative  Stability   Lachman: normal (-1 to 2mm)   PCL-Posterior Drawer: normal (0 to 2mm)  MCL - Valgus: normal (0 to 2mm)  LCL - Varus: normal (0 to 2mm)  Patella   Patellar apprehension: negative  Passive Patellar Tilt: neutral  Patellar Tracking: normal  Patellar Grind: negative    Other   Sensation: normal    Muscle Strength   Right Lower Extremity   Quadriceps:  5/5   Hamstrin/5   Left Lower Extremity   Quadriceps:  5/5   Hamstrin/5     Vascular Exam     Right Pulses  Dorsalis Pedis:      2+  Posterior Tibial:      2+        Left Pulses  Dorsalis Pedis:      2+  Posterior Tibial:      2+    Imaging:  X-rays of the knees from 2022 personally reviewed by me on that day.  These include weight-bearing AP, PA flexion, lateral, and Merchant views.  There is mild arthritic changes throughout the knee but overall the joint spaces are well preserved.    MRI of the left knee 2022 personally reviewed by me 2022.  There is thinning of the articular cartilage in the medial compartment as well as the patellofemoral compartment.  There is a degenerative-type tear of the posterior horn of the  medial meniscus.  This does not extend posterior root.  Lateral meniscus including root was intact.  The cruciate and collateral ligaments are intact.            Assessment:     La Ly is a 62 y.o. female with tear of the left posterior horn medial meniscus  Encounter Diagnosis   Name Primary?    Complex tear of medial meniscus of left knee as current injury, initial encounter Yes            Plan:       She is done very well.  We will continue her home exercise program.  She can do activities as tolerated.  Return to clinic on an as-needed basis.    All of their questions were answered.  They will call the clinic with any questions or concerns in the interim.    Should the patient's symptoms worsen, persist, or fail to improve they should return for reevaluation and I would be happy to see them back anytime.        Karl Morocho M.D.    Please be aware that this note has been generated with the assistance of [facility] voice-to-text.  Please excuse any spelling or grammatical errors.    Thank you for choosing Dr. Karl Morocho for your sports medicine care. It is our goal to provide you with exceptional care that will help keep you healthy, active, and get you back in the game.     If you felt that you received exemplary care today, please consider leaving feedback for Dr. Morocho on [employer] at [email or web address].    Please do not hesitate to reach out to us via email, phone, or MyChart with any questions, concerns, or feedback.

## 2023-04-26 DIAGNOSIS — M25.561 RIGHT KNEE PAIN: Primary | ICD-10-CM

## 2023-09-13 DIAGNOSIS — Z78.0 ASYMPTOMATIC MENOPAUSAL STATE: ICD-10-CM

## 2023-09-13 DIAGNOSIS — Z12.2 ENCOUNTER FOR SCREENING FOR MALIGNANT NEOPLASM OF RESPIRATORY ORGANS: Primary | ICD-10-CM

## 2023-09-13 DIAGNOSIS — Z12.31 ENCOUNTER FOR SCREENING MAMMOGRAM FOR MALIGNANT NEOPLASM OF BREAST: ICD-10-CM

## 2023-09-13 DIAGNOSIS — Z13.820 ENCOUNTER FOR SCREENING FOR OSTEOPOROSIS: ICD-10-CM

## 2023-09-13 DIAGNOSIS — Z87.891 PERSONAL HISTORY OF NICOTINE DEPENDENCE: ICD-10-CM

## 2023-10-30 ENCOUNTER — TELEPHONE (OUTPATIENT)
Dept: PULMONOLOGY | Facility: CLINIC | Age: 63
End: 2023-10-30
Payer: MEDICARE

## 2023-10-30 NOTE — TELEPHONE ENCOUNTER
----- Message from Peewee Uribe MA sent at 10/30/2023  2:06 PM CDT -----  Contact: [phone]    ----- Message -----  From: Aliyah Salcedo  Sent: 10/30/2023   1:50 PM CDT  To: [facility] Clinical Staff    KAEL ANDERSON calling regarding Patient Advice (message) Pt return a miss call from [facility] asking for a call back

## 2023-10-31 ENCOUNTER — TELEPHONE (OUTPATIENT)
Dept: PULMONOLOGY | Facility: CLINIC | Age: 63
End: 2023-10-31
Payer: MEDICARE

## 2023-11-01 ENCOUNTER — TELEPHONE (OUTPATIENT)
Dept: PULMONOLOGY | Facility: CLINIC | Age: 63
End: 2023-11-01
Payer: MEDICARE

## 2023-11-02 ENCOUNTER — TELEPHONE (OUTPATIENT)
Dept: PULMONOLOGY | Facility: CLINIC | Age: 63
End: 2023-11-02
Payer: MEDICARE

## 2023-11-02 NOTE — NURSING
Lung Cancer Screening Navigation   Intake  Date: 11/02/23  LDCT:Lung-RADS Category:  4A - Suspicious - consultation advised - possible next steps 3 month LDCT -in some scenarios PET/CT.  Internal Referral  Date of Referral: 10/24/23  Initial Nurse Navigator Contact: 11/29/23  Date Worked: 11/02/23  First Appointment Available: 11/08/23  Appointment Date: 11/08/23  First Available Date vs. Scheduled Date (days): 0  Multiple appointments: No     Treatment   Appointment with Dr. Chirinos: November 8, 2023   Support Systems: Spouse/significant other; Family members; Yazidi / violet community     Acuity   Needed: 0  Support: 0  Verbalizes Financial Concerns: 0  Transportation: 0  Mental Health: PHQ Score: 0  Verbalizes the need for more education: 0  Other Factors (+1 for Each): 0  Navigation Acuity: 0     Follow Up  No follow-ups on file.

## 2023-11-08 ENCOUNTER — OFFICE VISIT (OUTPATIENT)
Dept: PULMONOLOGY | Facility: CLINIC | Age: 63
End: 2023-11-08
Payer: MEDICARE

## 2023-11-08 VITALS
DIASTOLIC BLOOD PRESSURE: 72 MMHG | HEIGHT: 67 IN | HEART RATE: 68 BPM | OXYGEN SATURATION: 100 % | BODY MASS INDEX: 35.94 KG/M2 | SYSTOLIC BLOOD PRESSURE: 176 MMHG | WEIGHT: 229 LBS

## 2023-11-08 DIAGNOSIS — Z01.810 PRE-OPERATIVE CARDIOVASCULAR EXAMINATION: Primary | ICD-10-CM

## 2023-11-08 DIAGNOSIS — J43.2 CENTRILOBULAR EMPHYSEMA: ICD-10-CM

## 2023-11-08 DIAGNOSIS — R91.1 NODULE OF APEX OF LEFT LUNG: Primary | ICD-10-CM

## 2023-11-08 DIAGNOSIS — F17.218 CIGARETTE NICOTINE DEPENDENCE WITH OTHER NICOTINE-INDUCED DISORDER: ICD-10-CM

## 2023-11-08 PROBLEM — F17.210 DEPENDENCE ON NICOTINE FROM CIGARETTES: Status: ACTIVE | Noted: 2023-11-08

## 2023-11-08 PROCEDURE — 3077F SYST BP >= 140 MM HG: CPT | Mod: CPTII,S$GLB,, | Performed by: INTERNAL MEDICINE

## 2023-11-08 PROCEDURE — 3008F BODY MASS INDEX DOCD: CPT | Mod: CPTII,S$GLB,, | Performed by: INTERNAL MEDICINE

## 2023-11-08 PROCEDURE — 3078F PR MOST RECENT DIASTOLIC BLOOD PRESSURE < 80 MM HG: ICD-10-PCS | Mod: CPTII,S$GLB,, | Performed by: INTERNAL MEDICINE

## 2023-11-08 PROCEDURE — 1159F MED LIST DOCD IN RCRD: CPT | Mod: CPTII,S$GLB,, | Performed by: INTERNAL MEDICINE

## 2023-11-08 PROCEDURE — 99999 PR PBB SHADOW E&M-EST. PATIENT-LVL V: CPT | Mod: PBBFAC,,, | Performed by: INTERNAL MEDICINE

## 2023-11-08 PROCEDURE — 1159F PR MEDICATION LIST DOCUMENTED IN MEDICAL RECORD: ICD-10-PCS | Mod: CPTII,S$GLB,, | Performed by: INTERNAL MEDICINE

## 2023-11-08 PROCEDURE — 99999 PR PBB SHADOW E&M-EST. PATIENT-LVL V: ICD-10-PCS | Mod: PBBFAC,,, | Performed by: INTERNAL MEDICINE

## 2023-11-08 PROCEDURE — 3077F PR MOST RECENT SYSTOLIC BLOOD PRESSURE >= 140 MM HG: ICD-10-PCS | Mod: CPTII,S$GLB,, | Performed by: INTERNAL MEDICINE

## 2023-11-08 PROCEDURE — 99204 OFFICE O/P NEW MOD 45 MIN: CPT | Mod: GC,S$GLB,, | Performed by: INTERNAL MEDICINE

## 2023-11-08 PROCEDURE — 3008F PR BODY MASS INDEX (BMI) DOCUMENTED: ICD-10-PCS | Mod: CPTII,S$GLB,, | Performed by: INTERNAL MEDICINE

## 2023-11-08 PROCEDURE — 99204 PR OFFICE/OUTPT VISIT, NEW, LEVL IV, 45-59 MIN: ICD-10-PCS | Mod: GC,S$GLB,, | Performed by: INTERNAL MEDICINE

## 2023-11-08 PROCEDURE — 3078F DIAST BP <80 MM HG: CPT | Mod: CPTII,S$GLB,, | Performed by: INTERNAL MEDICINE

## 2023-11-08 RX ORDER — MICONAZOLE NITRATE 2 %
2 CREAM (GRAM) TOPICAL
Qty: 100 EACH | Refills: 3 | Status: SHIPPED | OUTPATIENT
Start: 2023-11-08 | End: 2023-12-11

## 2023-11-08 NOTE — ASSESSMENT & PLAN NOTE
1.6cm nodule in the left apex. Discussed further evaluation with bronchoscopy. Given no other significant medical comorbidities and good exercise tolerance may be a candidate for single-anesthesia event. Will have her meet with the cardiothoracic surgeons for further evaluation and discussion.   - PET-CT  - PFTs  - referral to cardiothoracic surgery  - bronchoscopy scheduled for 12/5   - risks and benefits of procedure discussed with  Patient   - counseled to avoid NSAIDs and other blood thinners prior to procedure and to hold any diabetes medications including ozempic for at least one week prior to procedure

## 2023-11-08 NOTE — PROGRESS NOTES
[facility]    SUBJECTIVE:     Chief Complaint: pulmonary nodule    History of Present Illness:  La Ly is a 63 y.o. female with a history of psoriasis who presents for evaluation of a pulmonary nodule.     Patient reports good exercise tolerance, she goes to the gym tree times per week and uses the treadmill or rowing machine. She does have intermittent shortness of breath for the last few months with some wheezing that improves with an albuterol inhaler. She reports shortness of breath does not seem to be triggered by anything specific, sometimes gets it after exercising but most of the time does not have any SOB. Uses her albuterol inhaler about once a week.     She denies any fevers, chills, night sweats. No cough. No unintentional weight loss - has been using ozempic, exercise, and diet changes to lose weight.     She denies any LE edema, orthopnea, PND. No personal history of cancer.     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  Pets/hobbies: none  Recent illness: none  B-Symptoms: none  Intubation Hx: none  Hx childhood asthma/atopy: yes  Family Hx: no cancer or lung problems     Review of patient's allergies indicates:  No Known Allergies    Past Medical History:   Diagnosis Date    Eczema     Hyperlipidemia      Past Surgical History:   Procedure Laterality Date    COLONOSCOPY N/A 5/14/2018    Procedure: COLONOSCOPY;  Surgeon: Hayden Doherty Jr., MD;  Location: [facility];  Service: Endoscopy;  Laterality: N/A;    TUBAL LIGATION       Family History   Problem Relation Age of Onset    COPD Mother     No Known Problems Father      Social History     Socioeconomic History  "   Marital status:    Tobacco Use    Smoking status: Every Day     Current packs/day: 0.50     Average packs/day: 1.3 packs/day for 50.9 years (65.9 ttl pk-yrs)     Types: Cigarettes     Start date: 1973    Smokeless tobacco: Never    Tobacco comments:     2 cig a day   Substance and Sexual Activity    Alcohol use: No    Drug use: No    Sexual activity: Yes       Review of Systems:  ROS  CV: no syncope  ENT: no sore throat  Resp: per hpi  Eyes: no eye pain  Gastrointestinal: no nausea or vomiting  Integument/Breast: no rash  Musculoskeletal: no arthralgias  Neurological: no headaches  Behavioral/Psych: no confusion or depression  Heme: no bleeding      OBJECTIVE:     Vital Signs  Vitals:    11/08/23 1111   BP: (!) 176/72   BP Location: Right arm   Patient Position: Sitting   BP Method: Medium (Automatic)   Pulse: 68   SpO2: 100%   Weight: 103.9 kg (229 lb)   Height: 5' 7" (1.702 m)     Body mass index is 35.87 kg/m².    Physical Exam:  Physical Exam  General: well-appearing, sitting in clinic in NAD  Eyes:  conjunctivae anicteric  Nose: no discharge  Lungs: CTAB, no wheezing, no crackles, normal respiratory effort  Heart: regular rate and rhythm, no murmurs  Abdomen: non-distended  Extremities: no cyanosis, no edema, no clubbing  Skin: No rashes or lesions  Neurologic: alert, oriented, thought content appropriate    Laboratory:  CBC  Lab Results   Component Value Date    WBC 13.34 (H) 09/20/2022    HGB 13.4 09/20/2022    HCT 41.1 09/20/2022     09/20/2022    MCV 92 09/20/2022    RDW 12.3 09/20/2022     BMP  Lab Results   Component Value Date     09/20/2022    K 4.3 09/20/2022     09/20/2022    CO2 28 09/20/2022    BUN 15 09/20/2022    CREATININE 0.82 09/20/2022     (H) 09/20/2022    CALCIUM 9.5 09/20/2022     LFTs  Lab Results   Component Value Date    PROT 6.9 09/20/2022    ALBUMIN 4.4 09/20/2022    BILITOT 0.3 09/20/2022    AST 25 09/20/2022    ALKPHOS 59 09/20/2022    ALT 28 " 09/20/2022     Chest Imaging  CT Chest 10/23/2023  The soft tissues and vascular structures at the base the neck are unremarkable.  The mediastinum including the heart and great vessels is significant for calcification of the aorta.  The visualized intra-abdominal content is unremarkable.  The osseous structures demonstrate degenerative change.     The trachea is patent and free of any intraluminal filling defects.  The lungs are well expanded.  There is bilateral apical pleural thickening and/or scar.  There is centrilobular.  There is a 1.6 x 1 cm left upper lobe pulmonary nodule containing a small amount of calcification.  There is no pleural or pericardial fluid.  There are no priors available for comparison.      ASSESSMENT/PLAN:     Problem Noted   Nodule of [place] of Left Lung 11/8/2023   Centrilobular Emphysema 11/8/2023   Dependence On Nicotine From Cigarettes 11/8/2023     Problem List Items Addressed This Visit          Pulmonary    Nodule of apex of left lung - Primary    Current Assessment & Plan     1.6cm nodule in the left apex. Discussed further evaluation with bronchoscopy. Given no other significant medical comorbidities and good exercise tolerance may be a candidate for single-anesthesia event. Will have her meet with the cardiothoracic surgeons for further evaluation and discussion.   - PET-CT  - PFTs  - referral to cardiothoracic surgery  - bronchoscopy scheduled for 12/5   - risks and benefits of procedure discussed with  Patient   - counseled to avoid NSAIDs and other blood thinners prior to procedure and to hold any diabetes medications including ozempic for at least one week prior to procedure          Relevant Orders    NM PET CT FDG Skull Base to Mid Thigh    Spirometry with/without bronchodilator    DLCO-Carbon Monoxide Diffusing Capacity    Ambulatory referral/consult to Thoracic Surgery    Pulse Oximetry Q4H    Case Request Operating Room: BRONCHOSCOPY, NAVIGATIONAL (Completed)    Full  code    Centrilobular emphysema    Current Assessment & Plan     CT chest with evidence of emphysema. She does report intermittent shortness of breath that improves with albuterol inhaler. Has not had PFTs down in the past.   - PFTs  - continue albuterol PRN          Relevant Orders    Spirometry with/without bronchodilator    DLCO-Carbon Monoxide Diffusing Capacity       Other    Dependence on nicotine from cigarettes    Current Assessment & Plan     Dangers of cigarette smoking were reviewed with patient and smoking cessation was discussed. Patient was Referred to Tobacco Cessation Program. Nicotine replacement options were discussed.   - referral to tobacco cessation program  - nicotine gum PRN         Relevant Medications    nicotine, polacrilex, (NICORETTE) 2 mg Gum    Other Relevant Orders    Ambulatory referral/consult to Smoking Cessation Program       Sharifa Palmer MD  [facility]

## 2023-11-08 NOTE — ASSESSMENT & PLAN NOTE
CT chest with evidence of emphysema. She does report intermittent shortness of breath that improves with albuterol inhaler. Has not had PFTs down in the past.   - PFTs  - continue albuterol PRN

## 2023-11-08 NOTE — ASSESSMENT & PLAN NOTE
Dangers of cigarette smoking were reviewed with patient and smoking cessation was discussed. Patient was Referred to Tobacco Cessation Program. Nicotine replacement options were discussed.   - referral to tobacco cessation program  - nicotine gum PRN

## 2023-11-09 ENCOUNTER — TELEPHONE (OUTPATIENT)
Dept: CARDIOLOGY | Facility: HOSPITAL | Age: 63
End: 2023-11-09
Payer: MEDICARE

## 2023-11-09 NOTE — PROGRESS NOTES
History & Physical    SUBJECTIVE:     History of Present Illness:  Patient is a 63 y.o. female smoker with HLD and eczema (MTX) who is referred to clinic by Dr. Chirinos for consideration of DANNY for FREEDOM lung nodule. LDCT 10/23/23 showed a 1.6 x 1 cm FREEDOM lung nodule. Completed stress echo and lung function tests this morning. PET scheduled next week. Here today with her daughters. Able to walk on flat ground and ascend flight of stairs without NELSON. Has rx for norco for arthritic knee pain however does not take it.     Former ~2ppd smoker. Quit 15 years ago. Occasionally smokes a cigarette.   PSH: colonoscopy, tubal ligation  Meds: lipitor, meloxicam, methotrexate, ozempic*    Chief Complaint   Patient presents with    Consult       Review of patient's allergies indicates:  No Known Allergies    Current Outpatient Medications   Medication Sig Dispense Refill    atorvastatin (LIPITOR) 40 MG tablet Take 40 mg by mouth every evening.      diphenhydrAMINE (BENADRYL) 50 MG capsule Take 1 capsule (50 mg total) by mouth every 6 (six) hours as needed for Itching or Allergies. 20 capsule 0    HYDROcodone-acetaminophen (NORCO) 5-325 mg per tablet Take 1 tablet by mouth every 6 (six) hours as needed for Pain. 12 tablet 0    meloxicam (MOBIC) 15 MG tablet TAKE 1 TABLET BY MOUTH EVERY DAY 30 tablet 1    methotrexate 2.5 MG Tab Take by mouth.      naproxen (NAPROSYN) 500 MG tablet Take 1 tablet (500 mg total) by mouth 2 (two) times daily with meals. 20 tablet 0    nicotine, polacrilex, (NICORETTE) 2 mg Gum Take 1 each (2 mg total) by mouth every 2 (two) hours as needed. 100 each 3    albuterol (PROVENTIL/VENTOLIN HFA) 90 mcg/actuation inhaler Inhale 1-2 puffs into the lungs every 6 (six) hours as needed for Wheezing. Rescue 1 Inhaler 0     No current facility-administered medications for this visit.       Past Medical History:   Diagnosis Date    Eczema     Hyperlipidemia      Past Surgical History:   Procedure Laterality Date     "COLONOSCOPY N/A 5/14/2018    Procedure: COLONOSCOPY;  Surgeon: Hayden Doherty Jr., MD;  Location: [facility];  Service: Endoscopy;  Laterality: N/A;    TUBAL LIGATION       Family History   Problem Relation Age of Onset    COPD Mother     No Known Problems Father      Social History     Tobacco Use    Smoking status: Every Day     Current packs/day: 0.50     Average packs/day: 1.3 packs/day for 50.9 years (65.9 ttl pk-yrs)     Types: Cigarettes     Start date: 1973    Smokeless tobacco: Never    Tobacco comments:     2 cig a day   Substance Use Topics    Alcohol use: No    Drug use: No        Review of Systems:  Review of Systems   Constitutional:  Negative for activity change, appetite change and fatigue.   Respiratory:  Negative for cough and shortness of breath.    Gastrointestinal:  Negative for abdominal pain, nausea and vomiting.   Genitourinary:  Negative for difficulty urinating.   Musculoskeletal:  Negative for arthralgias.   Skin:  Negative for color change and rash.   Neurological:  Negative for dizziness.   Hematological:  Negative for adenopathy.   Psychiatric/Behavioral:  Negative for agitation. The patient is not nervous/anxious.        OBJECTIVE:     Vital Signs (Most Recent)  Pulse: 69 (11/13/23 1121)  BP: 135/75 (11/13/23 1121)  SpO2: 99 % (11/13/23 1121)  5' 7" (1.702 m)  103.9 kg (229 lb 0.9 oz)     Physical Exam:  Physical Exam  Constitutional:       Appearance: Normal appearance.   HENT:      Head: Atraumatic.   Eyes:      Extraocular Movements: Extraocular movements intact.   Cardiovascular:      Rate and Rhythm: Normal rate and regular rhythm.   Pulmonary:      Effort: Pulmonary effort is normal.      Breath sounds: Normal breath sounds.   Abdominal:      General: Abdomen is flat.      Palpations: Abdomen is soft.   Musculoskeletal:      Cervical back: Normal range of motion.   Skin:     General: Skin is warm and dry.   Neurological:      General: No focal deficit present.      Mental " Status: She is alert and oriented to person, place, and time.   Psychiatric:         Mood and Affect: Mood normal.         Behavior: Behavior normal.         Diagnostic Results:    LDCT 10/23/23:   The trachea is patent and free of any intraluminal filling defects.  The lungs are well expanded.  There is bilateral apical pleural thickening and/or scar.  There is centrilobular.  There is a 1.6 x 1 cm left upper lobe pulmonary nodule containing a small amount of calcification.  There is no pleural or pericardial fluid.  There are no priors available for comparison.     PFTs 11/13/23:   FEV1 1.57L 74.6%  DLCO: 14.29L  62.7%     PET/CT: scheduled      ASSESSMENT/PLAN:     Patient is a 63 y.o. female smoker with HLD and eczema (MTX) who is referred to clinic by Dr. Chirinos for consideration of DANNY for FREEDOM lung nodule. LDCT 10/23/23 showed a 1.6 x 1 cm.   PET pending.     PLAN:Plan     PFTs support lobectomy. Pre-op labs today. Await stress and PET results. Pending, proceed to OR for DANNY and if positive left robotic assisted upper lobectomy with MLND, possible thoracotomy.   Appropriate patient education regarding the praveena-operative period as well as intraoperative details were discussed. Risks, including but not limited to, bleeding, infection, pain and anesthetic complication were discussed. Patient was given the opportunity to ask questions and to have those questions answered to their satisfaction. Patient verbalized understanding to both procedure and associated risks. Consent was obtained.

## 2023-11-09 NOTE — H&P (VIEW-ONLY)
History & Physical    SUBJECTIVE:     History of Present Illness:  Patient is a 63 y.o. female smoker with HLD and eczema (MTX) who is referred to clinic by Dr. Chirinos for consideration of DANNY for FREEDOM lung nodule. LDCT 10/23/23 showed a 1.6 x 1 cm FREEDOM lung nodule. Completed stress echo and lung function tests this morning. PET scheduled next week. Here today with her daughters. Able to walk on flat ground and ascend flight of stairs without NELSON. Has rx for norco for arthritic knee pain however does not take it.     Former ~2ppd smoker. Quit 15 years ago. Occasionally smokes a cigarette.   PSH: colonoscopy, tubal ligation  Meds: lipitor, meloxicam, methotrexate, ozempic*    Chief Complaint   Patient presents with    Consult       Review of patient's allergies indicates:  No Known Allergies    Current Outpatient Medications   Medication Sig Dispense Refill    atorvastatin (LIPITOR) 40 MG tablet Take 40 mg by mouth every evening.      diphenhydrAMINE (BENADRYL) 50 MG capsule Take 1 capsule (50 mg total) by mouth every 6 (six) hours as needed for Itching or Allergies. 20 capsule 0    HYDROcodone-acetaminophen (NORCO) 5-325 mg per tablet Take 1 tablet by mouth every 6 (six) hours as needed for Pain. 12 tablet 0    meloxicam (MOBIC) 15 MG tablet TAKE 1 TABLET BY MOUTH EVERY DAY 30 tablet 1    methotrexate 2.5 MG Tab Take by mouth.      naproxen (NAPROSYN) 500 MG tablet Take 1 tablet (500 mg total) by mouth 2 (two) times daily with meals. 20 tablet 0    nicotine, polacrilex, (NICORETTE) 2 mg Gum Take 1 each (2 mg total) by mouth every 2 (two) hours as needed. 100 each 3    albuterol (PROVENTIL/VENTOLIN HFA) 90 mcg/actuation inhaler Inhale 1-2 puffs into the lungs every 6 (six) hours as needed for Wheezing. Rescue 1 Inhaler 0     No current facility-administered medications for this visit.       Past Medical History:   Diagnosis Date    Eczema     Hyperlipidemia      Past Surgical History:   Procedure Laterality Date     "COLONOSCOPY N/A 5/14/2018    Procedure: COLONOSCOPY;  Surgeon: Hayden Doherty Jr., MD;  Location: [facility];  Service: Endoscopy;  Laterality: N/A;    TUBAL LIGATION       Family History   Problem Relation Age of Onset    COPD Mother     No Known Problems Father      Social History     Tobacco Use    Smoking status: Every Day     Current packs/day: 0.50     Average packs/day: 1.3 packs/day for 50.9 years (65.9 ttl pk-yrs)     Types: Cigarettes     Start date: 1973    Smokeless tobacco: Never    Tobacco comments:     2 cig a day   Substance Use Topics    Alcohol use: No    Drug use: No        Review of Systems:  Review of Systems   Constitutional:  Negative for activity change, appetite change and fatigue.   Respiratory:  Negative for cough and shortness of breath.    Gastrointestinal:  Negative for abdominal pain, nausea and vomiting.   Genitourinary:  Negative for difficulty urinating.   Musculoskeletal:  Negative for arthralgias.   Skin:  Negative for color change and rash.   Neurological:  Negative for dizziness.   Hematological:  Negative for adenopathy.   Psychiatric/Behavioral:  Negative for agitation. The patient is not nervous/anxious.        OBJECTIVE:     Vital Signs (Most Recent)  Pulse: 69 (11/13/23 1121)  BP: 135/75 (11/13/23 1121)  SpO2: 99 % (11/13/23 1121)  5' 7" (1.702 m)  103.9 kg (229 lb 0.9 oz)     Physical Exam:  Physical Exam  Constitutional:       Appearance: Normal appearance.   HENT:      Head: Atraumatic.   Eyes:      Extraocular Movements: Extraocular movements intact.   Cardiovascular:      Rate and Rhythm: Normal rate and regular rhythm.   Pulmonary:      Effort: Pulmonary effort is normal.      Breath sounds: Normal breath sounds.   Abdominal:      General: Abdomen is flat.      Palpations: Abdomen is soft.   Musculoskeletal:      Cervical back: Normal range of motion.   Skin:     General: Skin is warm and dry.   Neurological:      General: No focal deficit present.      Mental " Status: She is alert and oriented to person, place, and time.   Psychiatric:         Mood and Affect: Mood normal.         Behavior: Behavior normal.         Diagnostic Results:    LDCT 10/23/23:   The trachea is patent and free of any intraluminal filling defects.  The lungs are well expanded.  There is bilateral apical pleural thickening and/or scar.  There is centrilobular.  There is a 1.6 x 1 cm left upper lobe pulmonary nodule containing a small amount of calcification.  There is no pleural or pericardial fluid.  There are no priors available for comparison.     PFTs 11/13/23:   FEV1 1.57L 74.6%  DLCO: 14.29L  62.7%     PET/CT: scheduled      ASSESSMENT/PLAN:     Patient is a 63 y.o. female smoker with HLD and eczema (MTX) who is referred to clinic by Dr. Chirinos for consideration of DANNY for FREEDOM lung nodule. LDCT 10/23/23 showed a 1.6 x 1 cm.   PET pending.     PLAN:Plan     PFTs support lobectomy. Pre-op labs today. Await stress and PET results. Pending, proceed to OR for DANNY and if positive left robotic assisted upper lobectomy with MLND, possible thoracotomy.   Appropriate patient education regarding the praveena-operative period as well as intraoperative details were discussed. Risks, including but not limited to, bleeding, infection, pain and anesthetic complication were discussed. Patient was given the opportunity to ask questions and to have those questions answered to their satisfaction. Patient verbalized understanding to both procedure and associated risks. Consent was obtained.

## 2023-11-13 ENCOUNTER — HOSPITAL ENCOUNTER (OUTPATIENT)
Dept: CARDIOLOGY | Facility: HOSPITAL | Age: 63
Discharge: HOME | End: 2023-11-13
Attending: PHYSICIAN ASSISTANT
Payer: MEDICARE

## 2023-11-13 ENCOUNTER — HOSPITAL ENCOUNTER (OUTPATIENT)
Dept: PULMONOLOGY | Facility: CLINIC | Age: 63
Discharge: HOME | End: 2023-11-13
Payer: MEDICARE

## 2023-11-13 ENCOUNTER — OFFICE VISIT (OUTPATIENT)
Dept: CARDIOTHORACIC SURGERY | Facility: CLINIC | Age: 63
End: 2023-11-13
Payer: MEDICARE

## 2023-11-13 VITALS
WEIGHT: 229 LBS | HEIGHT: 67 IN | BODY MASS INDEX: 35.94 KG/M2 | SYSTOLIC BLOOD PRESSURE: 144 MMHG | DIASTOLIC BLOOD PRESSURE: 79 MMHG | HEART RATE: 62 BPM | RESPIRATION RATE: 16 BRPM

## 2023-11-13 VITALS
OXYGEN SATURATION: 99 % | HEIGHT: 67 IN | DIASTOLIC BLOOD PRESSURE: 75 MMHG | WEIGHT: 229.06 LBS | HEART RATE: 69 BPM | BODY MASS INDEX: 35.95 KG/M2 | SYSTOLIC BLOOD PRESSURE: 135 MMHG

## 2023-11-13 DIAGNOSIS — R91.1 NODULE OF APEX OF LEFT LUNG: ICD-10-CM

## 2023-11-13 DIAGNOSIS — D68.9 COAGULOPATHY: ICD-10-CM

## 2023-11-13 DIAGNOSIS — J43.2 CENTRILOBULAR EMPHYSEMA: ICD-10-CM

## 2023-11-13 DIAGNOSIS — Z01.810 PRE-OPERATIVE CARDIOVASCULAR EXAMINATION: ICD-10-CM

## 2023-11-13 LAB
CV STRESS BASE HR: 52 BPM
DIASTOLIC BLOOD PRESSURE: 67 MMHG
DLCO SINGLE BREATH LLN: 17.05
DLCO SINGLE BREATH PRE REF: 62.7 %
DLCO SINGLE BREATH REF: 22.78
DLCOC SBVA LLN: 3.04
DLCOC SBVA REF: 4.46
DLCOC SINGLE BREATH LLN: 17.05
DLCOC SINGLE BREATH REF: 22.78
DLCOCSBVAULN: 5.88
DLCOCSINGLEBREATHULN: 28.51
DLCOSINGLEBREATHULN: 28.51
DLCOVA LLN: 3.04
DLCOVA PRE REF: 96.4 %
DLCOVA PRE: 4.3 ML/(MIN*MMHG*L) (ref 3.04–5.88)
DLCOVA REF: 4.46
DLCOVAULN: 5.88
EJECTION FRACTION- HIGH: 59 %
END DIASTOLIC INDEX-HIGH: 155 ML/M2
END DIASTOLIC INDEX-LOW: 91 ML/M2
END SYSTOLIC INDEX-HIGH: 78 ML/M2
END SYSTOLIC INDEX-LOW: 40 ML/M2
FEF 25 75 LLN: 0.76
FEF 25 75 PRE REF: 71.9 %
FEF 25 75 REF: 1.88
FET100 CHG: 7 %
FEV05 LLN: 0.98
FEV05 REF: 1.83
FEV1 CHG: 15.1 %
FEV1 FVC LLN: 67
FEV1 FVC PRE REF: 97.5 %
FEV1 FVC REF: 79
FEV1 LLN: 1.51
FEV1 PRE REF: 74.6 %
FEV1 REF: 2.11
FEV1 VOL CHG: 0.24
FVC CHG: 11.3 %
FVC LLN: 1.94
FVC PRE REF: 76 %
FVC REF: 2.68
FVC VOL CHG: 0.23
IVC PRE: 1.85 L (ref 1.94–3.46)
IVC SINGLE BREATH LLN: 1.94
IVC SINGLE BREATH PRE REF: 69 %
IVC SINGLE BREATH REF: 2.68
IVCSINGLEBREATHULN: 3.46
NUC REST DIASTOLIC VOLUME INDEX: 71
NUC REST EJECTION FRACTION: 64
NUC REST SYSTOLIC VOLUME INDEX: 26
NUC STRESS DIASTOLIC VOLUME INDEX: 75
NUC STRESS EJECTION FRACTION: 55 %
NUC STRESS SYSTOLIC VOLUME INDEX: 34
OHS CV CPX 1 MINUTE RECOVERY HEART RATE: 71 BPM
OHS CV CPX 85 PERCENT MAX PREDICTED HEART RATE MALE: 133
OHS CV CPX MAX PREDICTED HEART RATE: 156
OHS CV CPX PATIENT IS FEMALE: 1
OHS CV CPX PATIENT IS MALE: 0
OHS CV CPX PEAK DIASTOLIC BLOOD PRESSURE: 67 MMHG
OHS CV CPX PEAK HEAR RATE: 67 BPM
OHS CV CPX PEAK RATE PRESSURE PRODUCT: 9849
OHS CV CPX PEAK SYSTOLIC BLOOD PRESSURE: 147 MMHG
OHS CV CPX PERCENT MAX PREDICTED HEART RATE ACHIEVED: 45
OHS CV CPX RATE PRESSURE PRODUCT PRESENTING: 7852
PEF LLN: 3.42
PEF PRE REF: 75.2 %
PEF REF: 5.49
PHYSICIAN COMMENT: ABNORMAL
POST FEF 25 75: 1.23 L/S (ref 0.76–3.51)
POST FET 100: 6.56 SEC
POST FEV1 FVC: 79.76 % (ref 66.9–89.59)
POST FEV1: 1.81 L (ref 1.51–2.68)
POST FEV5: 1.23 L (ref 0.98–2.69)
POST FVC: 2.27 L (ref 1.94–3.46)
POST PEF: 4.92 L/S (ref 3.42–7.55)
PRE DLCO: 14.29 ML/(MIN*MMHG) (ref 17.05–28.51)
PRE FEF 25 75: 1.35 L/S (ref 0.76–3.51)
PRE FET 100: 6.13 SEC
PRE FEV05 REF: 66 %
PRE FEV1 FVC: 77.11 % (ref 66.9–89.59)
PRE FEV1: 1.57 L (ref 1.51–2.68)
PRE FEV5: 1.21 L (ref 0.98–2.69)
PRE FVC: 2.04 L (ref 1.94–3.46)
PRE PEF: 4.12 L/S (ref 3.42–7.55)
RETIRED EF AND QEF - SEE NOTES: 47 %
SYSTOLIC BLOOD PRESSURE: 151 MMHG
VA PRE: 3.32 L (ref 4.96–4.96)
VA SINGLE BREATH LLN: 4.96
VA SINGLE BREATH PRE REF: 67.1 %
VA SINGLE BREATH REF: 4.96
VASINGLEBREATHULN: 4.96

## 2023-11-13 PROCEDURE — 94060 PR EVAL OF BRONCHOSPASM: ICD-10-PCS | Mod: S$GLB,,, | Performed by: INTERNAL MEDICINE

## 2023-11-13 PROCEDURE — 94729 PR C02/MEMBANE DIFFUSE CAPACITY: ICD-10-PCS | Mod: S$GLB,,, | Performed by: INTERNAL MEDICINE

## 2023-11-13 PROCEDURE — 3075F PR MOST RECENT SYSTOLIC BLOOD PRESS GE 130-139MM HG: ICD-10-PCS | Mod: CPTII,S$GLB,, | Performed by: STUDENT IN AN ORGANIZED HEALTH CARE EDUCATION/TRAINING PROGRAM

## 2023-11-13 PROCEDURE — 3078F DIAST BP <80 MM HG: CPT | Mod: CPTII,S$GLB,, | Performed by: STUDENT IN AN ORGANIZED HEALTH CARE EDUCATION/TRAINING PROGRAM

## 2023-11-13 PROCEDURE — 94729 DIFFUSING CAPACITY: CPT | Mod: S$GLB,,, | Performed by: INTERNAL MEDICINE

## 2023-11-13 PROCEDURE — 93016 CV STRESS TEST SUPVJ ONLY: CPT | Mod: ,,, | Performed by: INTERNAL MEDICINE

## 2023-11-13 PROCEDURE — 93018 CV STRESS TEST I&R ONLY: CPT | Mod: ,,, | Performed by: INTERNAL MEDICINE

## 2023-11-13 PROCEDURE — 1159F MED LIST DOCD IN RCRD: CPT | Mod: CPTII,S$GLB,, | Performed by: STUDENT IN AN ORGANIZED HEALTH CARE EDUCATION/TRAINING PROGRAM

## 2023-11-13 PROCEDURE — 1159F PR MEDICATION LIST DOCUMENTED IN MEDICAL RECORD: ICD-10-PCS | Mod: CPTII,S$GLB,, | Performed by: STUDENT IN AN ORGANIZED HEALTH CARE EDUCATION/TRAINING PROGRAM

## 2023-11-13 PROCEDURE — 99205 PR OFFICE/OUTPT VISIT, NEW, LEVL V, 60-74 MIN: ICD-10-PCS | Mod: 57,S$GLB,, | Performed by: STUDENT IN AN ORGANIZED HEALTH CARE EDUCATION/TRAINING PROGRAM

## 2023-11-13 PROCEDURE — 93018 NUCLEAR STRESS - CARDIOLOGY INTERPRETED (CUPID ONLY): ICD-10-PCS | Mod: ,,, | Performed by: INTERNAL MEDICINE

## 2023-11-13 PROCEDURE — 99205 OFFICE O/P NEW HI 60 MIN: CPT | Mod: 57,S$GLB,, | Performed by: STUDENT IN AN ORGANIZED HEALTH CARE EDUCATION/TRAINING PROGRAM

## 2023-11-13 PROCEDURE — 78452 NUCLEAR STRESS - CARDIOLOGY INTERPRETED (CUPID ONLY): ICD-10-PCS | Mod: 26,,, | Performed by: INTERNAL MEDICINE

## 2023-11-13 PROCEDURE — A9502 TC99M TETROFOSMIN: HCPCS

## 2023-11-13 PROCEDURE — 3008F BODY MASS INDEX DOCD: CPT | Mod: CPTII,S$GLB,, | Performed by: STUDENT IN AN ORGANIZED HEALTH CARE EDUCATION/TRAINING PROGRAM

## 2023-11-13 PROCEDURE — 93017 CV STRESS TEST TRACING ONLY: CPT

## 2023-11-13 PROCEDURE — 99999 PR PBB SHADOW E&M-EST. PATIENT-LVL III: CPT | Mod: PBBFAC,,, | Performed by: STUDENT IN AN ORGANIZED HEALTH CARE EDUCATION/TRAINING PROGRAM

## 2023-11-13 PROCEDURE — 99999 PR PBB SHADOW E&M-EST. PATIENT-LVL III: ICD-10-PCS | Mod: PBBFAC,,, | Performed by: STUDENT IN AN ORGANIZED HEALTH CARE EDUCATION/TRAINING PROGRAM

## 2023-11-13 PROCEDURE — 93016 NUCLEAR STRESS - CARDIOLOGY INTERPRETED (CUPID ONLY): ICD-10-PCS | Mod: ,,, | Performed by: INTERNAL MEDICINE

## 2023-11-13 PROCEDURE — 3008F PR BODY MASS INDEX (BMI) DOCUMENTED: ICD-10-PCS | Mod: CPTII,S$GLB,, | Performed by: STUDENT IN AN ORGANIZED HEALTH CARE EDUCATION/TRAINING PROGRAM

## 2023-11-13 PROCEDURE — 3078F PR MOST RECENT DIASTOLIC BLOOD PRESSURE < 80 MM HG: ICD-10-PCS | Mod: CPTII,S$GLB,, | Performed by: STUDENT IN AN ORGANIZED HEALTH CARE EDUCATION/TRAINING PROGRAM

## 2023-11-13 PROCEDURE — 78452 HT MUSCLE IMAGE SPECT MULT: CPT | Mod: 26,,, | Performed by: INTERNAL MEDICINE

## 2023-11-13 PROCEDURE — 3075F SYST BP GE 130 - 139MM HG: CPT | Mod: CPTII,S$GLB,, | Performed by: STUDENT IN AN ORGANIZED HEALTH CARE EDUCATION/TRAINING PROGRAM

## 2023-11-13 PROCEDURE — 94060 EVALUATION OF WHEEZING: CPT | Mod: S$GLB,,, | Performed by: INTERNAL MEDICINE

## 2023-11-13 PROCEDURE — 63600175 PHARM REV CODE 636 W HCPCS: Performed by: PHYSICIAN ASSISTANT

## 2023-11-13 RX ORDER — REGADENOSON 0.08 MG/ML
0.4 INJECTION, SOLUTION INTRAVENOUS
Status: COMPLETED | OUTPATIENT
Start: 2023-11-13 | End: 2023-11-13

## 2023-11-13 RX ORDER — AMINOPHYLLINE 25 MG/ML
75 INJECTION, SOLUTION INTRAVENOUS
Status: COMPLETED | OUTPATIENT
Start: 2023-11-13 | End: 2023-11-13

## 2023-11-13 RX ADMIN — AMINOPHYLLINE 75 MG: 25 INJECTION, SOLUTION INTRAVENOUS at 08:11

## 2023-11-13 RX ADMIN — REGADENOSON 0.4 MG: 0.08 INJECTION, SOLUTION INTRAVENOUS at 08:11

## 2023-11-22 ENCOUNTER — HOSPITAL ENCOUNTER (OUTPATIENT)
Dept: RADIOLOGY | Facility: HOSPITAL | Age: 63
Discharge: HOME | End: 2023-11-22
Attending: INTERNAL MEDICINE
Payer: MEDICARE

## 2023-11-22 DIAGNOSIS — R91.1 NODULE OF APEX OF LEFT LUNG: ICD-10-CM

## 2023-11-22 PROCEDURE — 78815 NM PET CT FDG SKULL BASE TO MID THIGH: ICD-10-PCS | Mod: 26,PI,, | Performed by: STUDENT IN AN ORGANIZED HEALTH CARE EDUCATION/TRAINING PROGRAM

## 2023-11-22 PROCEDURE — A9552 F18 FDG: HCPCS

## 2023-11-22 PROCEDURE — 78815 PET IMAGE W/CT SKULL-THIGH: CPT | Mod: 26,PI,, | Performed by: STUDENT IN AN ORGANIZED HEALTH CARE EDUCATION/TRAINING PROGRAM

## 2023-11-28 NOTE — PROGRESS NOTES
There's plenty of room on conference list if you would like to add on. Will send list shortly. Just got out of OR

## 2023-12-01 ENCOUNTER — TELEPHONE (OUTPATIENT)
Dept: PULMONOLOGY | Facility: CLINIC | Age: 63
End: 2023-12-01
Payer: MEDICARE

## 2023-12-01 NOTE — TELEPHONE ENCOUNTER
Phone call to patient to give instructions for EBUS/Robotic procedure scheduled for 12/5/23.   Patient instructed to arrive to the 2nd floor [facility] check in desk at 0530 on 12/5/23  with designated .  NPO after midnight the night prior to scheduled procedure.  Patient not currently taking any GLP1 or antiplatelet/anticoags.  Patient verbalizes understanding of all above instructions given.

## 2023-12-04 ENCOUNTER — ANESTHESIA EVENT (OUTPATIENT)
Dept: SURGERY | Facility: HOSPITAL | Age: 63
DRG: 168 | End: 2023-12-04
Payer: MEDICARE

## 2023-12-05 ENCOUNTER — HOSPITAL ENCOUNTER (INPATIENT)
Facility: HOSPITAL | Age: 63
LOS: 1 days | Discharge: HOME | DRG: 168 | End: 2023-12-05
Attending: INTERNAL MEDICINE | Admitting: STUDENT IN AN ORGANIZED HEALTH CARE EDUCATION/TRAINING PROGRAM
Payer: MEDICARE

## 2023-12-05 ENCOUNTER — ANESTHESIA (OUTPATIENT)
Dept: SURGERY | Facility: HOSPITAL | Age: 63
DRG: 168 | End: 2023-12-05
Payer: MEDICARE

## 2023-12-05 VITALS
BODY MASS INDEX: 32.49 KG/M2 | SYSTOLIC BLOOD PRESSURE: 139 MMHG | HEART RATE: 65 BPM | DIASTOLIC BLOOD PRESSURE: 88 MMHG | OXYGEN SATURATION: 97 % | HEIGHT: 67 IN | TEMPERATURE: 98 F | WEIGHT: 207 LBS | RESPIRATION RATE: 17 BRPM

## 2023-12-05 DIAGNOSIS — R91.1 NODULE OF APEX OF LEFT LUNG: ICD-10-CM

## 2023-12-05 DIAGNOSIS — R91.1 LUNG NODULE: ICD-10-CM

## 2023-12-05 LAB
ABO + RH BLD: NORMAL
BLD GP AB SCN CELLS X3 SERPL QL: NORMAL
SPECIMEN OUTDATE: NORMAL

## 2023-12-05 PROCEDURE — 88112 CYTOPATH CELL ENHANCE TECH: CPT | Performed by: STUDENT IN AN ORGANIZED HEALTH CARE EDUCATION/TRAINING PROGRAM

## 2023-12-05 PROCEDURE — 88177 CYTP FNA EVAL EA ADDL: CPT | Performed by: STUDENT IN AN ORGANIZED HEALTH CARE EDUCATION/TRAINING PROGRAM

## 2023-12-05 PROCEDURE — 88173 CYTOPATH EVAL FNA REPORT: CPT | Performed by: STUDENT IN AN ORGANIZED HEALTH CARE EDUCATION/TRAINING PROGRAM

## 2023-12-05 PROCEDURE — 88312 SPECIAL STAINS GROUP 1: CPT | Mod: 26,ICN,, | Performed by: PATHOLOGY

## 2023-12-05 PROCEDURE — 31627 NAVIGATIONAL BRONCHOSCOPY: CPT | Mod: ,,, | Performed by: INTERNAL MEDICINE

## 2023-12-05 PROCEDURE — D9220A PRA ANESTHESIA: Mod: CRNA,,, | Performed by: NURSE ANESTHETIST, CERTIFIED REGISTERED

## 2023-12-05 PROCEDURE — 63600175 PHARM REV CODE 636 W HCPCS: Performed by: NURSE ANESTHETIST, CERTIFIED REGISTERED

## 2023-12-05 PROCEDURE — 71000016 HC POSTOP RECOV ADDL HR: Performed by: INTERNAL MEDICINE

## 2023-12-05 PROCEDURE — 25000003 PHARM REV CODE 250: Performed by: PHYSICIAN ASSISTANT

## 2023-12-05 PROCEDURE — 88112 PR  CYTOPATH, CELL ENHANCE TECH: ICD-10-PCS | Mod: 26,59,, | Performed by: STUDENT IN AN ORGANIZED HEALTH CARE EDUCATION/TRAINING PROGRAM

## 2023-12-05 PROCEDURE — 31627 PR BRONCHOSCOPY,COMPUTER ASSIST/IMAGE-GUIDED NAVIGATION: ICD-10-PCS | Mod: ,,, | Performed by: INTERNAL MEDICINE

## 2023-12-05 PROCEDURE — 27201037 HC PRESSURE MONITORING SET UP

## 2023-12-05 PROCEDURE — 25000003 PHARM REV CODE 250: Performed by: NURSE ANESTHETIST, CERTIFIED REGISTERED

## 2023-12-05 PROCEDURE — 31654 PR BRONCH W/ EBUS, DIAG OR THERA INTERVENTION PERIPHERAL LESION(S), INCL GUID, ADD ON CODE: ICD-10-PCS | Mod: ,,, | Performed by: INTERNAL MEDICINE

## 2023-12-05 PROCEDURE — 71000015 HC POSTOP RECOV 1ST HR: Performed by: INTERNAL MEDICINE

## 2023-12-05 PROCEDURE — 88173 PR  INTERPRETATION OF FNA SMEAR: ICD-10-PCS | Mod: 26,,, | Performed by: STUDENT IN AN ORGANIZED HEALTH CARE EDUCATION/TRAINING PROGRAM

## 2023-12-05 PROCEDURE — 27201423 OPTIME MED/SURG SUP & DEVICES STERILE SUPPLY: Performed by: INTERNAL MEDICINE

## 2023-12-05 PROCEDURE — 88173 CYTOPATH EVAL FNA REPORT: CPT | Mod: 26,,, | Performed by: STUDENT IN AN ORGANIZED HEALTH CARE EDUCATION/TRAINING PROGRAM

## 2023-12-05 PROCEDURE — 36000708 HC OR TIME LEV III 1ST 15 MIN: Performed by: INTERNAL MEDICINE

## 2023-12-05 PROCEDURE — 71000044 HC DOSC ROUTINE RECOVERY FIRST HOUR: Performed by: INTERNAL MEDICINE

## 2023-12-05 PROCEDURE — 36415 COLL VENOUS BLD VENIPUNCTURE: CPT | Performed by: PHYSICIAN ASSISTANT

## 2023-12-05 PROCEDURE — 37000009 HC ANESTHESIA EA ADD 15 MINS: Performed by: INTERNAL MEDICINE

## 2023-12-05 PROCEDURE — 88312 PR  SPECIAL STAINS,GROUP I: ICD-10-PCS | Mod: 26,ICN,, | Performed by: PATHOLOGY

## 2023-12-05 PROCEDURE — D9220A PRA ANESTHESIA: Mod: ANES,,, | Performed by: ANESTHESIOLOGY

## 2023-12-05 PROCEDURE — 31629 PR BRONCHOSCOPY,TRANSBRON ASPIR BX: ICD-10-PCS | Mod: LT,,, | Performed by: INTERNAL MEDICINE

## 2023-12-05 PROCEDURE — C1729 CATH, DRAINAGE: HCPCS | Performed by: INTERNAL MEDICINE

## 2023-12-05 PROCEDURE — 37000008 HC ANESTHESIA 1ST 15 MINUTES: Performed by: INTERNAL MEDICINE

## 2023-12-05 PROCEDURE — 88305 TISSUE EXAM BY PATHOLOGIST: CPT | Mod: 26,,, | Performed by: STUDENT IN AN ORGANIZED HEALTH CARE EDUCATION/TRAINING PROGRAM

## 2023-12-05 PROCEDURE — 12000002 HC ACUTE/MED SURGE SEMI-PRIVATE ROOM

## 2023-12-05 PROCEDURE — 88312 SPECIAL STAINS GROUP 1: CPT | Performed by: STUDENT IN AN ORGANIZED HEALTH CARE EDUCATION/TRAINING PROGRAM

## 2023-12-05 PROCEDURE — 88305 TISSUE EXAM BY PATHOLOGIST: ICD-10-PCS | Mod: 26,,, | Performed by: STUDENT IN AN ORGANIZED HEALTH CARE EDUCATION/TRAINING PROGRAM

## 2023-12-05 PROCEDURE — 31628 BRONCHOSCOPY/LUNG BX EACH: CPT | Mod: 51,LT,, | Performed by: INTERNAL MEDICINE

## 2023-12-05 PROCEDURE — 31629 BRONCHOSCOPY/NEEDLE BX EACH: CPT | Mod: LT,,, | Performed by: INTERNAL MEDICINE

## 2023-12-05 PROCEDURE — C1726 CATH, BAL DIL, NON-VASCULAR: HCPCS | Performed by: INTERNAL MEDICINE

## 2023-12-05 PROCEDURE — D9220A PRA ANESTHESIA: ICD-10-PCS | Mod: CRNA,,, | Performed by: NURSE ANESTHETIST, CERTIFIED REGISTERED

## 2023-12-05 PROCEDURE — 88177 PR  EVALUATION OF FNA SMEAR TO DETERMINE ADEQUACY, EA ADD EVAL: ICD-10-PCS | Mod: 26,,, | Performed by: STUDENT IN AN ORGANIZED HEALTH CARE EDUCATION/TRAINING PROGRAM

## 2023-12-05 PROCEDURE — 88305 TISSUE EXAM BY PATHOLOGIST: CPT | Mod: 59 | Performed by: STUDENT IN AN ORGANIZED HEALTH CARE EDUCATION/TRAINING PROGRAM

## 2023-12-05 PROCEDURE — 88172 CYTP DX EVAL FNA 1ST EA SITE: CPT | Performed by: STUDENT IN AN ORGANIZED HEALTH CARE EDUCATION/TRAINING PROGRAM

## 2023-12-05 PROCEDURE — 36000709 HC OR TIME LEV III EA ADD 15 MIN: Performed by: INTERNAL MEDICINE

## 2023-12-05 PROCEDURE — 88177 CYTP FNA EVAL EA ADDL: CPT | Mod: 26,,, | Performed by: STUDENT IN AN ORGANIZED HEALTH CARE EDUCATION/TRAINING PROGRAM

## 2023-12-05 PROCEDURE — 88172 PR  EVALUATION OF FNA SMEAR TO DETERMINE ADEQUACY, FIRST EVAL: ICD-10-PCS | Mod: 26,,, | Performed by: STUDENT IN AN ORGANIZED HEALTH CARE EDUCATION/TRAINING PROGRAM

## 2023-12-05 PROCEDURE — 88172 CYTP DX EVAL FNA 1ST EA SITE: CPT | Mod: 26,,, | Performed by: STUDENT IN AN ORGANIZED HEALTH CARE EDUCATION/TRAINING PROGRAM

## 2023-12-05 PROCEDURE — 86850 RBC ANTIBODY SCREEN: CPT | Performed by: PHYSICIAN ASSISTANT

## 2023-12-05 PROCEDURE — 31654 BRONCH EBUS IVNTJ PERPH LES: CPT | Mod: ,,, | Performed by: INTERNAL MEDICINE

## 2023-12-05 PROCEDURE — 88333 PATH CONSLTJ SURG CYTO XM 1: CPT | Performed by: STUDENT IN AN ORGANIZED HEALTH CARE EDUCATION/TRAINING PROGRAM

## 2023-12-05 PROCEDURE — 31628 PR BRONCHOSCOPY,TRANSBRONCH BIOPSY: ICD-10-PCS | Mod: 51,LT,, | Performed by: INTERNAL MEDICINE

## 2023-12-05 PROCEDURE — 88112 CYTOPATH CELL ENHANCE TECH: CPT | Mod: 26,59,, | Performed by: STUDENT IN AN ORGANIZED HEALTH CARE EDUCATION/TRAINING PROGRAM

## 2023-12-05 PROCEDURE — D9220A PRA ANESTHESIA: ICD-10-PCS | Mod: ANES,,, | Performed by: ANESTHESIOLOGY

## 2023-12-05 RX ORDER — ONDANSETRON 2 MG/ML
4 INJECTION INTRAMUSCULAR; INTRAVENOUS DAILY PRN
Status: DISCONTINUED | OUTPATIENT
Start: 2023-12-05 | End: 2023-12-05 | Stop reason: HOSPADM

## 2023-12-05 RX ORDER — LIDOCAINE HYDROCHLORIDE 20 MG/ML
INJECTION, SOLUTION EPIDURAL; INFILTRATION; INTRACAUDAL; PERINEURAL
Status: DISCONTINUED | OUTPATIENT
Start: 2023-12-05 | End: 2023-12-05

## 2023-12-05 RX ORDER — FENTANYL CITRATE 50 UG/ML
INJECTION, SOLUTION INTRAMUSCULAR; INTRAVENOUS
Status: DISCONTINUED | OUTPATIENT
Start: 2023-12-05 | End: 2023-12-05

## 2023-12-05 RX ORDER — FENTANYL CITRATE 50 UG/ML
25 INJECTION, SOLUTION INTRAMUSCULAR; INTRAVENOUS EVERY 5 MIN PRN
Status: DISCONTINUED | OUTPATIENT
Start: 2023-12-05 | End: 2023-12-05 | Stop reason: HOSPADM

## 2023-12-05 RX ORDER — METHOCARBAMOL 500 MG/1
1000 TABLET, FILM COATED ORAL ONCE
Status: DISCONTINUED | OUTPATIENT
Start: 2023-12-05 | End: 2023-12-05 | Stop reason: HOSPADM

## 2023-12-05 RX ORDER — DEXAMETHASONE SODIUM PHOSPHATE 4 MG/ML
INJECTION, SOLUTION INTRA-ARTICULAR; INTRALESIONAL; INTRAMUSCULAR; INTRAVENOUS; SOFT TISSUE
Status: DISCONTINUED | OUTPATIENT
Start: 2023-12-05 | End: 2023-12-05

## 2023-12-05 RX ORDER — HALOPERIDOL 5 MG/ML
0.5 INJECTION INTRAMUSCULAR EVERY 10 MIN PRN
Status: DISCONTINUED | OUTPATIENT
Start: 2023-12-05 | End: 2023-12-05 | Stop reason: HOSPADM

## 2023-12-05 RX ORDER — ONDANSETRON 2 MG/ML
INJECTION INTRAMUSCULAR; INTRAVENOUS
Status: DISCONTINUED | OUTPATIENT
Start: 2023-12-05 | End: 2023-12-05

## 2023-12-05 RX ORDER — MIDAZOLAM HYDROCHLORIDE 5 MG/ML
INJECTION INTRAMUSCULAR; INTRAVENOUS
Status: DISCONTINUED | OUTPATIENT
Start: 2023-12-05 | End: 2023-12-05

## 2023-12-05 RX ORDER — KETAMINE HCL IN 0.9 % NACL 50 MG/5 ML
SYRINGE (ML) INTRAVENOUS
Status: DISCONTINUED | OUTPATIENT
Start: 2023-12-05 | End: 2023-12-05

## 2023-12-05 RX ORDER — ACETAMINOPHEN 500 MG
1000 TABLET ORAL
Status: COMPLETED | OUTPATIENT
Start: 2023-12-05 | End: 2023-12-05

## 2023-12-05 RX ORDER — ROCURONIUM BROMIDE 10 MG/ML
INJECTION, SOLUTION INTRAVENOUS
Status: DISCONTINUED | OUTPATIENT
Start: 2023-12-05 | End: 2023-12-05

## 2023-12-05 RX ORDER — SODIUM CHLORIDE 0.9 % (FLUSH) 0.9 %
10 SYRINGE (ML) INJECTION
Status: DISCONTINUED | OUTPATIENT
Start: 2023-12-05 | End: 2023-12-05 | Stop reason: HOSPADM

## 2023-12-05 RX ORDER — OXYCODONE HYDROCHLORIDE 5 MG/1
5 TABLET ORAL
Status: DISCONTINUED | OUTPATIENT
Start: 2023-12-05 | End: 2023-12-05 | Stop reason: HOSPADM

## 2023-12-05 RX ORDER — PROPOFOL 10 MG/ML
VIAL (ML) INTRAVENOUS
Status: DISCONTINUED | OUTPATIENT
Start: 2023-12-05 | End: 2023-12-05

## 2023-12-05 RX ADMIN — ROCURONIUM BROMIDE 90 MG: 10 INJECTION INTRAVENOUS at 07:12

## 2023-12-05 RX ADMIN — GLYCOPYRROLATE 0.2 MG: 0.2 INJECTION, SOLUTION INTRAMUSCULAR; INTRAVENOUS at 07:12

## 2023-12-05 RX ADMIN — ONDANSETRON 4 MG: 2 INJECTION INTRAMUSCULAR; INTRAVENOUS at 09:12

## 2023-12-05 RX ADMIN — SUGAMMADEX 200 MG: 100 INJECTION, SOLUTION INTRAVENOUS at 09:12

## 2023-12-05 RX ADMIN — MIDAZOLAM 2 MG: 5 INJECTION INTRAMUSCULAR; INTRAVENOUS at 07:12

## 2023-12-05 RX ADMIN — SODIUM CHLORIDE, SODIUM LACTATE, POTASSIUM CHLORIDE, AND CALCIUM CHLORIDE: .6; .31; .03; .02 INJECTION, SOLUTION INTRAVENOUS at 06:12

## 2023-12-05 RX ADMIN — PROPOFOL 150 MCG/KG/MIN: 10 INJECTION, EMULSION INTRAVENOUS at 08:12

## 2023-12-05 RX ADMIN — Medication 20 MG: at 07:12

## 2023-12-05 RX ADMIN — PROPOFOL 150 MCG/KG/MIN: 10 INJECTION, EMULSION INTRAVENOUS at 07:12

## 2023-12-05 RX ADMIN — FENTANYL CITRATE 100 MCG: 50 INJECTION, SOLUTION INTRAMUSCULAR; INTRAVENOUS at 07:12

## 2023-12-05 RX ADMIN — DEXAMETHASONE SODIUM PHOSPHATE 2 MG: 4 INJECTION, SOLUTION INTRAMUSCULAR; INTRAVENOUS at 08:12

## 2023-12-05 RX ADMIN — GABAPENTIN 400 MG: 300 CAPSULE ORAL at 06:12

## 2023-12-05 RX ADMIN — LIDOCAINE HYDROCHLORIDE 80 MG: 20 INJECTION, SOLUTION EPIDURAL; INFILTRATION; INTRACAUDAL; PERINEURAL at 07:12

## 2023-12-05 RX ADMIN — PROPOFOL 160 MG: 10 INJECTION, EMULSION INTRAVENOUS at 07:12

## 2023-12-05 RX ADMIN — ACETAMINOPHEN 1000 MG: 500 TABLET ORAL at 06:12

## 2023-12-05 NOTE — DISCHARGE INSTRUCTIONS
BRONCHOSCOPY:    ACTIVITY LEVEL:  If you received sedation or an anesthetic, you may feel sleepy for several hours. Do not drive, operate  machinery, make critical decisions, or perform activities that require coordination or balance until tomorrow  morning. Please have a responsible person stay with you for at least two (2) hours after you leave the hospital.  DIET:  Do not eat or drink anything until __________. Once you can drink clear liquids without coughing, you can  resume your regular diet.  WHAT you may expect over the next 24 hours:   You may experience a low grade fever.   You may cough up streaks of blood.   Take Tylenol as directed for comfort/fever.  Additional Instructions:   Do not take Aspirin, ibuprofen, naproxen, or any medications containing these items for _____ days  after the bronchoscopy.   If you normally take Coumadin or aspirin, you may restart on _____________________________.  COME TO THE EMERGENCY DEPARTMENT IF:   You cough up more than one (1) tablespoon of blood.   You have fever over 101°F (38.4°C) for more than one evening.   You experience shortness of breath that is of new onset, or that is increased from your usual baseline.   You experience chest pain.   You have chills.  RETURN APPOINTMENT: Follow up as directed.____________________________________________  FOR EMERGENCIES:  If any unusual problems or difficulties occur, contact ______________________________ or the resident at  [phone] (page ) or at the Clinic office, [phone] or [phone].

## 2023-12-05 NOTE — ANESTHESIA POSTPROCEDURE EVALUATION
Anesthesia Post Evaluation    Patient: La Ly    Procedure(s) Performed: Procedure(s) (LRB):  ROBOTIC BRONCHOSCOPY (N/A)    Final Anesthesia Type: general      Patient location during evaluation: PACU  Patient participation: Yes- Able to Participate  Level of consciousness: awake and alert and oriented  Post-procedure vital signs: reviewed and stable  Pain management: adequate  Airway patency: patent    PONV status at discharge: No PONV  Anesthetic complications: no      Cardiovascular status: hemodynamically stable  Respiratory status: nasal cannula  Hydration status: euvolemic  Follow-up not needed.              Vitals Value Taken Time   /88 12/05/23 1102   Temp 36.8 °C (98.2 °F) 12/05/23 1100   Pulse 68 12/05/23 1106   Resp 17 12/05/23 1100   SpO2 98 % 12/05/23 1106   Vitals shown include unvalidated device data.      No case tracking events are documented in the log.      Pain/Luiz Score: Pain Rating Prior to Med Admin: 0 (12/5/2023  6:57 AM)  Luiz Score: 10 (12/5/2023 11:00 AM)

## 2023-12-05 NOTE — INTERVAL H&P NOTE
The patient has been examined and the H&P has been reviewed:    I concur with the findings and no changes have occurred since H&P was written.    Surgery risks, benefits and alternative options discussed and understood by patient/family.          Active Hospital Problems    Diagnosis  POA    *Nodule of apex of left lung [R91.1]  Yes     Discussed case with thoracic surgery team and consider single anesthesia event.  Patient is interested.    Will schedule PET, PFTs and thoracic surgery evaluation  Will need cardiac evaluation.    Tentatively scheduled December 5    I have explained the risks, benefits and alternatives of the procedure in detail.  The patient voices understanding and all questions have been answered.  The patient agrees to proceed as planned.         Resolved Hospital Problems   No resolved problems to display.

## 2023-12-05 NOTE — ANESTHESIA PROCEDURE NOTES
Intubation    Date/Time: 12/5/2023 7:35 AM    Performed by: Mitch Pope III CRNA  Authorized by: Katia Esposito MD    Intubation:     Induction:  Intravenous    Intubated:  Postinduction    Mask Ventilation:  Easy with oral airway    Attempts:  1    Attempted By:  Student    Method of Intubation:  Video laryngoscopy    Blade:  Glidescope 3    Laryngeal View Grade: Grade I - full view of cords      Difficult Airway Encountered?: No      Complications:  None    Airway Device:  Oral endotracheal tube    Airway Device Size:  9.0    Style/Cuff Inflation:  Cuffed    Inflation Amount (mL):  8    Tube secured:  22    Secured at:  The lips    Placement Verified By:  Capnometry    Complicating Factors:  None    Findings Post-Intubation:  BS equal bilateral and atraumatic/condition of teeth unchanged

## 2023-12-05 NOTE — ANESTHESIA PREPROCEDURE EVALUATION
12/05/2023  La Ly is a 63 y.o., female.    Procedures:      ROBOTIC BRONCHOSCOPY      XI ROBOTIC RATS,WITH LOBECTOMY,LUNG - FREEDOM (Chest)      THORACOSCOPY, WITH LYMPHADENECTOMY - MLND   Anesthesia type: General   Diagnosis: Nodule of apex of left lung [R91.1]     Patient Active Problem List   Diagnosis    Screening for colorectal cancer    Nodule of apex of left lung    Centrilobular emphysema    Dependence on nicotine from cigarettes     Past Surgical History:   Procedure Laterality Date    COLONOSCOPY N/A 5/14/2018    Procedure: COLONOSCOPY;  Surgeon: Hayden Doherty Jr., MD;  Location: [facility];  Service: Endoscopy;  Laterality: N/A;    TUBAL LIGATION       Current Discharge Medication List        CONTINUE these medications which have NOT CHANGED    Details   albuterol (PROVENTIL/VENTOLIN HFA) 90 mcg/actuation inhaler Inhale 1-2 puffs into the lungs every 6 (six) hours as needed for Wheezing. Rescue  Qty: 1 Inhaler, Refills: 0      atorvastatin (LIPITOR) 40 MG tablet Take 40 mg by mouth every evening.      diphenhydrAMINE (BENADRYL) 50 MG capsule Take 1 capsule (50 mg total) by mouth every 6 (six) hours as needed for Itching or Allergies.  Qty: 20 capsule, Refills: 0      HYDROcodone-acetaminophen (NORCO) 5-325 mg per tablet Take 1 tablet by mouth every 6 (six) hours as needed for Pain.  Qty: 12 tablet, Refills: 0    Comments: Quantity prescribed more than 7 day supply? No      meloxicam (MOBIC) 15 MG tablet TAKE 1 TABLET BY MOUTH EVERY DAY  Qty: 30 tablet, Refills: 1    Associated Diagnoses: Left knee pain      methotrexate 2.5 MG Tab Take by mouth.      naproxen (NAPROSYN) 500 MG tablet Take 1 tablet (500 mg total) by mouth 2 (two) times daily with meals.  Qty: 20 tablet, Refills: 0      nicotine, polacrilex, (NICORETTE) 2 mg Gum Take 1 each (2 mg total) by mouth every 2 (two) hours as  needed.  Qty: 100 each, Refills: 3    Associated Diagnoses: Cigarette nicotine dependence with other nicotine-induced disorder           11/23    Normal myocardial perfusion scan. There is no evidence of myocardial ischemia or infarction.    The gated perfusion images showed an ejection fraction of 64% at rest. The gated perfusion images showed an ejection fraction of 55% post stress. Normal ejection fraction is greater than 47%.    There is normal wall motion at rest and post stress.    LV cavity size is normal at rest and normal at stress.    The ECG portion of the study is negative for ischemia.    The patient reported no chest pain during the stress test.    During stress, rare PVCs are noted.    There are no prior studies for comparison.    Pre-op Assessment    I have reviewed the Patient Summary Reports.     I have reviewed the Nursing Notes. I have reviewed the NPO Status.   I have reviewed the Medications.     Review of Systems  Anesthesia Hx:             Denies Family Hx of Anesthesia complications.    Denies Personal Hx of Anesthesia complications.                    Social:  Smoker           Physical Exam  General: Well nourished    Airway:  Mallampati: II / II  Mouth Opening: Normal  TM Distance: Normal  Tongue: Normal  Neck ROM: Normal ROM    Dental:  Edentulous    Chest/Lungs:  Clear to auscultation        Anesthesia Plan  Type of Anesthesia, risks & benefits discussed:    Anesthesia Type: Gen ETT  Intra-op Monitoring Plan: Standard ASA Monitors and Art Line  Post Op Pain Control Plan: multimodal analgesia and IV/PO Opioids PRN  Induction:  IV  Airway Plan: Direct  Informed Consent: Informed consent signed with the Patient and all parties understand the risks and agree with anesthesia plan.  All questions answered.   ASA Score: 3    Ready For Surgery From Anesthesia Perspective.     .

## 2023-12-05 NOTE — DISCHARGE SUMMARY
[facility] - [facility] (2nd Fl)  Discharge Note  Short Stay    Procedure(s) (LRB):  ROBOTIC BRONCHOSCOPY (N/A)  XI ROBOTIC RATS,WITH LOBECTOMY,LUNG - FREEDOM (N/A)  THORACOSCOPY, WITH LYMPHADENECTOMY - MLND (N/A)      OUTCOME: Patient tolerated treatment/procedure well without complication and is now ready for discharge.    DISPOSITION: Home or Self Care    FINAL DIAGNOSIS:  Nodule of apex of left lung    FOLLOWUP: In clinic    DISCHARGE INSTRUCTIONS:    Discharge Procedure Orders   Diet general     Call MD for:  temperature >101     Call MD for:  coughing up blood greater than 3 tablespoons in volume     Call MD for:  chest pain     Call MD for:  difficulty breathing or shortness of breath     Call MD for:  development of yellow/green sputum     Activity as tolerated        TIME SPENT ON DISCHARGE: 15 minutes

## 2023-12-05 NOTE — TRANSFER OF CARE
"Anesthesia Transfer of Care Note    Patient: La Ly    Procedure(s) Performed: Procedure(s) (LRB):  ROBOTIC BRONCHOSCOPY (N/A)  XI ROBOTIC RATS,WITH LOBECTOMY,LUNG - FREEDOM (N/A)  THORACOSCOPY, WITH LYMPHADENECTOMY - MLND (N/A)    Patient location: PACU    Transport from OR: Transported from OR on 6-10 L/min O2 by face mask with adequate spontaneous ventilation    Post pain: adequate analgesia    Post assessment: no apparent anesthetic complications    Post vital signs: stable    Level of consciousness: awake and alert    Complications: none    Transfer of care protocol was followed      Last vitals: Visit Vitals  BP (!) 186/97 (BP Location: Right arm, Patient Position: Lying)   Pulse 77   Temp 36.5 °C (97.7 °F) (Temporal)   Resp 20   Ht 5' 7" (1.702 m)   Wt 93.9 kg (207 lb)   SpO2 100%   Breastfeeding No   BMI 32.42 kg/m²     "

## 2023-12-05 NOTE — PLAN OF CARE
Discharge instructions reviewed and handout given to pt. Pt ambulated to restroom with assistance from family.  Dr Chirinos visited and discussed procedure and plan of care going forward.

## 2023-12-05 NOTE — ANESTHESIA PROCEDURE NOTES
Peripheral IV Insertion    Diagnosis: I99.8 Other disorder of circulatory system    Patient location during procedure: OR  Procedure start time: 12/5/2023 8:45 AM  Timeout: 12/5/2023 8:45 AM  Procedure end time: 12/5/2023 8:45 AM    Staffing  Authorizing Provider: Katia Esposito MD  Performing Provider: Micth Pope III, CRNA    Staffing  Performed by: Mitch Pope III, CRNA  Authorized by: Katia Esposito MD    Anesthesiologist was present at the time of the procedure.    Preanesthetic Checklist  Completed: patient identified, IV checked, site marked, risks and benefits discussed, surgical consent, monitors and equipment checked, pre-op evaluation, timeout performed and anesthesia consent givenPeripheral IV Insertion  Skin Prep: chlorhexidine gluconate  Orientation: left  Location: hand  Catheter Type: peripheral IV (single lumen)  Catheter Size: 14 G  Catheter placement by Anatomical landmarks. Heme positive aspiration all ports. Insertion Attempts: 1  Assessment  Dressing: secured with tape and tegaderm  Patient: Tolerated well  Line flushed easily.

## 2023-12-13 LAB
ADEQUACY: NORMAL
FINAL PATHOLOGIC DIAGNOSIS: NORMAL
MICROSCOPIC EXAM: NORMAL
SUPPLEMENTAL DIAGNOSIS: NORMAL

## 2025-04-02 DIAGNOSIS — Z12.31 ENCOUNTER FOR SCREENING MAMMOGRAM FOR MALIGNANT NEOPLASM OF BREAST: ICD-10-CM

## 2025-04-02 DIAGNOSIS — F17.210 NICOTINE DEPENDENCE, CIGARETTES, UNCOMPLICATED: Primary | ICD-10-CM

## 2025-04-17 DIAGNOSIS — F17.210 NICOTINE DEPENDENCE, CIGARETTES, UNCOMPLICATED: ICD-10-CM

## 2025-04-17 DIAGNOSIS — J44.9 CHRONIC OBSTRUCTIVE PULMONARY DISEASE: ICD-10-CM

## 2025-04-17 DIAGNOSIS — R91.1 SOLITARY PULMONARY NODULE: Primary | ICD-10-CM

## 2025-05-20 ENCOUNTER — HOSPITAL ENCOUNTER (OUTPATIENT)
Dept: RADIOLOGY | Facility: HOSPITAL | Age: 65
Discharge: HOME | End: 2025-05-20
Attending: INTERNAL MEDICINE
Payer: MEDICARE

## 2025-05-20 DIAGNOSIS — Z12.31 ENCOUNTER FOR SCREENING MAMMOGRAM FOR MALIGNANT NEOPLASM OF BREAST: ICD-10-CM

## 2025-05-20 DIAGNOSIS — F17.210 NICOTINE DEPENDENCE, CIGARETTES, UNCOMPLICATED: ICD-10-CM

## 2025-05-20 PROCEDURE — 77063 BREAST TOMOSYNTHESIS BI: CPT | Mod: TC,PN

## 2025-05-20 PROCEDURE — 77063 BREAST TOMOSYNTHESIS BI: CPT | Mod: 26,,, | Performed by: RADIOLOGY

## 2025-05-20 PROCEDURE — 71271 CT THORAX LUNG CANCER SCR C-: CPT | Mod: 26,,, | Performed by: STUDENT IN AN ORGANIZED HEALTH CARE EDUCATION/TRAINING PROGRAM

## 2025-05-20 PROCEDURE — 77067 SCR MAMMO BI INCL CAD: CPT | Mod: 26,,, | Performed by: RADIOLOGY

## 2025-05-20 PROCEDURE — 71271 CT THORAX LUNG CANCER SCR C-: CPT | Mod: TC,PN

## 2025-06-04 ENCOUNTER — TELEPHONE (OUTPATIENT)
Facility: CLINIC | Age: 65
End: 2025-06-04
Payer: MEDICARE

## 2025-07-29 ENCOUNTER — OFFICE VISIT (OUTPATIENT)
Facility: CLINIC | Age: 65
End: 2025-07-29
Payer: MEDICARE

## 2025-07-29 VITALS
BODY MASS INDEX: 31.08 KG/M2 | HEIGHT: 67 IN | SYSTOLIC BLOOD PRESSURE: 138 MMHG | OXYGEN SATURATION: 98 % | WEIGHT: 198 LBS | DIASTOLIC BLOOD PRESSURE: 78 MMHG

## 2025-07-29 DIAGNOSIS — F17.210 NICOTINE DEPENDENCE, CIGARETTES, UNCOMPLICATED: ICD-10-CM

## 2025-07-29 DIAGNOSIS — R91.1 SOLITARY PULMONARY NODULE: Primary | ICD-10-CM

## 2025-07-29 DIAGNOSIS — J44.9 CHRONIC OBSTRUCTIVE PULMONARY DISEASE: ICD-10-CM

## 2025-07-29 PROCEDURE — 3008F BODY MASS INDEX DOCD: CPT | Mod: CPTII,S$GLB,, | Performed by: INTERNAL MEDICINE

## 2025-07-29 PROCEDURE — 99205 OFFICE O/P NEW HI 60 MIN: CPT | Mod: S$GLB,,, | Performed by: INTERNAL MEDICINE

## 2025-07-29 PROCEDURE — 3075F SYST BP GE 130 - 139MM HG: CPT | Mod: CPTII,S$GLB,, | Performed by: INTERNAL MEDICINE

## 2025-07-29 PROCEDURE — 1126F AMNT PAIN NOTED NONE PRSNT: CPT | Mod: CPTII,S$GLB,, | Performed by: INTERNAL MEDICINE

## 2025-07-29 PROCEDURE — 1159F MED LIST DOCD IN RCRD: CPT | Mod: CPTII,S$GLB,, | Performed by: INTERNAL MEDICINE

## 2025-07-29 PROCEDURE — 1160F RVW MEDS BY RX/DR IN RCRD: CPT | Mod: CPTII,S$GLB,, | Performed by: INTERNAL MEDICINE

## 2025-07-29 PROCEDURE — 99999 PR PBB SHADOW E&M-EST. PATIENT-LVL III: CPT | Mod: PBBFAC,,, | Performed by: INTERNAL MEDICINE

## 2025-07-29 PROCEDURE — 3078F DIAST BP <80 MM HG: CPT | Mod: CPTII,S$GLB,, | Performed by: INTERNAL MEDICINE

## 2025-07-29 RX ORDER — VARENICLINE TARTRATE 0.5 (11)-1
KIT ORAL
Qty: 1 EACH | Refills: 11 | Status: SHIPPED | OUTPATIENT
Start: 2025-07-29

## 2025-07-31 PROBLEM — E66.01 MORBID (SEVERE) OBESITY DUE TO EXCESS CALORIES: Status: ACTIVE | Noted: 2025-07-31

## 2025-07-31 PROBLEM — J44.9 CHRONIC OBSTRUCTIVE PULMONARY DISEASE: Status: ACTIVE | Noted: 2025-07-31

## 2025-07-31 PROBLEM — R91.1 SOLITARY PULMONARY NODULE: Status: ACTIVE | Noted: 2025-07-31

## 2025-07-31 PROBLEM — E66.01 MORBID (SEVERE) OBESITY DUE TO EXCESS CALORIES: Status: RESOLVED | Noted: 2025-07-31 | Resolved: 2025-07-31

## 2025-07-31 NOTE — PROGRESS NOTES
[facility]    Today's Date: 07/31/2025    subjective    History of Present Illness    CHIEF COMPLAINT:  - Ms. Ly presents for follow-up to discuss results of previous lung biopsy and CTs.    HPI:  - Ms. Ly reports occasional dyspnea with excessive walking. She had a lung biopsy performed in the past and has been receiving yearly CTs to monitor her condition. The most recent CT shows no changes from previous scans, suggesting stability. Ms. Ly denies having any significant breathing problems or lung cancer.  12/2023 -- FREEDOM lesion biopsied and found benign.      MEDICAL HISTORY:  - Lung biopsy    TEST RESULTS:  - Lung biopsy: performed in the past    SOCIAL HISTORY:  - Smoking: Current smoker, reduced from previous higher consumption. Quit for 6 months in the past but resumed. Currently smokes 1-2 cigarettes at home.      ROS:  ROS findings as noted in HPI.          LDCT 5.20.2025  FINDINGS:  Lungs: There are no abnormal opacities that require further evaluation.  The largest opacity in the left lung appears solid with faint central calcification and measures 1.5 x 1.0 cm on series 4, image 62.  This appears unchanged compared to 10/23/2023. Mild linear atelectasis noted in the posterior right middle lobe.  The lungs show no findings consistent with emphysema.     Pleura:   No effusion..     Heart and pericardium: Normal size without effusion.     Aorta and vasculature: Atherosclerosis including coronary arteries.     Chest wall and skeletal structures: Unremarkable except age-appropriate degenerative changes.     Upper abdomen: Unremarkable.     Impression:     Lung-RADS Category: 2 - Benign Appearance or Behavior - continue annual screening with LDCT in 12 months.     Clinically or potentially clinically significant non lung cancer finding:  None.     Prior Lung Cancer Modifier:  No history of prior lung cancer.         Pathology 12.2023  Final Pathologic Diagnosis     1-2. Lung, left  upper lobe, fine needle aspiration and transbronchial biopsy:  - Rare multinucleated giant cells, rare minute fragments of calcified/ fibrotic debris and scant fragments of alveolar parenchyma and bronchial wall without significant histopathologic alteration (see comment)  - Abundant background peripheral blood elements/ blood clot  - GMS stain pending; results to follow in a supplemental report    Comment:  There is no evidence of discrete granuloma formation, atypia, or malignancy. Given the focality of findings and scant nature of the tissue, sampling issues are a potential consideration.  Clinical and radiological correlation is required to determine  adequacy of sampling and repeat biopsy should be considered in the setting of persistent clinical concern. VC      Comment: Interp By Sheryl Oden M.D., Signed on 12/13/2023 at 12:22   Supplemental Diagnosis     PARTS 2A.  Special stain GMS is negative for fungal organisms (performed with appropriate controls).    Only this stain interpretation has been performed by Dr. Sheryl Oden. Rest of the case has been interpreted and reported by Dr. Jesus Bhardwaj. VC      Microscopic Exam     1. Direct smears show predominantly peripheral blood elements with rare scattered multinucleated giant cells, few macrophages, and benign bronchial epithelium.  FNA cellblock sections show abundant blood clot/ peripheral blood elements.    2. Touch preparation cytology shows predominantly peripheral blood elements with benign bronchial epithelium and macrophages. Permanent sections show rare minute fragments of calcified/fibrotic debris in a background of scant fragments of bronchial wall  and unremarkable alveolar lung parenchyma. VC      Adequacy     Part 1 - FREEDOM FNA  Pass #1- Blood, bronchial cells, Pass #2- Blood, bronchial cells, Pass #3- Blood, bronchial cells, Pass #4- Blood, bronchial cells, Pass #5- Blood, bronchial cells  Part 2 - FREEDOM TBBX  Pass #1 - Inadequate, Pass #2 -  Inadequate, Pass #3 - Inadequate, Pass #4 - Inadequate, Pass #5 - Inadequate, Passes#6-#8- Inadequate    Pathologist: Dr. Bhardwaj  Results reported to Dr. Chirinos on 12/5/23            objective     Vitals:    07/29/25 1403   BP: 138/78     Physical Exam    General: No acute distress. Well-developed. Well-nourished.  Eyes: EOMI. Sclerae anicteric.  HENT: Normocephalic. Atraumatic. Nares patent. Moist oral mucosa.  Cardiovascular: Regular rate. Regular rhythm. No murmurs. No rubs. No gallops. Normal S1, S2.  Respiratory: Normal respiratory effort. Clear to auscultation bilaterally. No rales. No rhonchi. No wheezing.  Musculoskeletal: No  obvious deformity.  Extremities: No lower extremity edema.  Neurological: Alert & oriented x3. No slurred speech. Normal gait.  Psychiatric: Normal mood. Normal affect. Good insight. Good judgment.  Skin: Warm. Dry. No rash.  IMAGING:  - CT Chest: yearly scans performed, most recent scan unchanged from previous       assessment & plan     Assessment & Plan    CHRONIC OBSTRUCTIVE PULMONARY DISEASE:   Contact the office if any breathing issues arise before next appointment.    SOLITARY PULMONARY NODULE:   Ordered CT Lungs in 1 year.   Follow up in 1 year for repeat CT Lungs.  See pathology note from 12.2023 abov e    NICOTINE DEPENDENCE, CIGARETTES, UNCOMPLICATED:   Patienit willing to try Chantix. Script written.         Summary/Orders  1. Solitary pulmonary nodule  - Ambulatory referral/consult to Pulmonology    2. Chronic obstructive pulmonary disease  - Ambulatory referral/consult to Pulmonology    3. Nicotine dependence, cigarettes, uncomplicated  - Ambulatory referral/consult to Pulmonology  - varenicline tartrate (CHANTIX STARTING MONTH BOX) 0.5 mg (11)- 1 mg (42) tablet; Take one 0.5mg tab by mouth once daily X3 days,then increase to one 0.5mg tab twice daily X4 days,then increase to one 1mg tab twice daily  Dispense: 1 each; Refill: 11         Toño Kinsey MD  Pulmonary &  Critical Care Medicine  [facility]  O -    -     This note was generated with the assistance of ambient listening technology. Verbal consent was obtained by the patient and accompanying visitor(s) for the recording of patient appointment to facilitate this note. I attest to having reviewed and edited the generated note for accuracy, though some syntax or spelling errors may persist. Please contact the author of this note for any clarification.

## (undated) DEVICE — DRAPE ABDOMINAL TIBURON 14X11

## (undated) DEVICE — ALCOHOL BLEND 95%

## (undated) DEVICE — TAPE SILK 3IN

## (undated) DEVICE — NDL SPINAL 18GX3.5 SPINOCAN

## (undated) DEVICE — SYR SLIP TIP 20CC

## (undated) DEVICE — NDL ASPIRATING VIZISHOT 20-40M

## (undated) DEVICE — SYR 10CC LUER LOCK

## (undated) DEVICE — DRAPE SCOPE PILLOW WARMER

## (undated) DEVICE — LOOP VESSEL BLUE MAXI

## (undated) DEVICE — SUT 0 VICRYL / CT-1

## (undated) DEVICE — PACK ECLIPSE SET-UP W/O DRAPE

## (undated) DEVICE — NDL VIZISHOT 2 FLEX 22G

## (undated) DEVICE — SEAL UNIVERSAL 5MM-8MM XI

## (undated) DEVICE — SUT VICRYL PLUS 2-0 CT1 18

## (undated) DEVICE — ELECTRODE REM PLYHSV RETURN 9

## (undated) DEVICE — SOL WATER STRL IRR 1000ML

## (undated) DEVICE — CONTAINER SPECIMEN STRL 4OZ

## (undated) DEVICE — SUT 0 30IN ETHIBOND EXCEL G

## (undated) DEVICE — SPONGE COTTON TRAY 4X4IN

## (undated) DEVICE — DRAPE COLUMN DAVINCI XI

## (undated) DEVICE — SUT 2-0 VICRYL / CT-1

## (undated) DEVICE — DRESSING SURGICAL 1X3

## (undated) DEVICE — SET TRI-LUMEN FILTERED TUBE

## (undated) DEVICE — FORCEP JAW RADIAL PULM 2X100CM

## (undated) DEVICE — PENCIL GOLF STERILE

## (undated) DEVICE — TRAY MINOR GEN SURG OMC

## (undated) DEVICE — GLOVE BIOGEL ORTHOPEDIC 7.5

## (undated) DEVICE — BAG ION VISION PROBE

## (undated) DEVICE — BOWL STERILE LARGE 32OZ

## (undated) DEVICE — SYR ONLY LUER LOCK 20CC

## (undated) DEVICE — DRESSING TRANS 2X2 TEGADERM

## (undated) DEVICE — GOWN POLY REINF BRTH SLV XL

## (undated) DEVICE — CANNULA REDUCER 12-8MM

## (undated) DEVICE — DRAPE THREE-QTR REINF 53X77IN

## (undated) DEVICE — HEMOSTAT SURGICEL NUKNIT 3X4IN

## (undated) DEVICE — GAUZE DRAIN N WVN 6PLY 4X4IN

## (undated) DEVICE — DRESSING TRANS 4X4 TEGADERM

## (undated) DEVICE — RELOAD ENDO GIA TRISTAPLE 45MM

## (undated) DEVICE — OBTURATOR BLADELESS 8MM XI CLR

## (undated) DEVICE — CYTOSPIN COLLECTION FLUID BLT

## (undated) DEVICE — KIT ANTIFOG W/SPONG & FLUID

## (undated) DEVICE — DRAPE INCISE IOBAN 2 23X17IN

## (undated) DEVICE — TUBING SUC UNIV W/CONN 12FT

## (undated) DEVICE — NDL FLEXISION BIOPSY 21G

## (undated) DEVICE — SYS LABEL CORRECT MED

## (undated) DEVICE — SUT MONOCRYL UD 4-0 27 PS-1

## (undated) DEVICE — NDL VIZISHOT 2 FLEX 19G

## (undated) DEVICE — STAPLER GIA HANDLE STD

## (undated) DEVICE — COVER LIGHT HANDLE

## (undated) DEVICE — LUBRICANT SURGILUBE 2 OZ

## (undated) DEVICE — CANNULA SEAL 12MM

## (undated) DEVICE — SUT SILK 0 STRANDS 30IN BLK

## (undated) DEVICE — ELECTRODE BLADE INSULATED 1 IN

## (undated) DEVICE — ADAPTER VISION PROBE & SUCTION

## (undated) DEVICE — CONNECTOR SWIVEL

## (undated) DEVICE — CATH BRONCHOSCOPE F/BF

## (undated) DEVICE — GOWN SMART IMP BREATHABLE XXLG

## (undated) DEVICE — PORT AIRSEAL 12/120MM LPI

## (undated) DEVICE — CATH THORACIC 28FR ST

## (undated) DEVICE — SUT MCRYL PLUS 4-0 PS2 27IN

## (undated) DEVICE — DRAPE ARM DAVINCI XI

## (undated) DEVICE — ADAPTER SWIVEL